# Patient Record
Sex: FEMALE | Race: WHITE | NOT HISPANIC OR LATINO | URBAN - METROPOLITAN AREA
[De-identification: names, ages, dates, MRNs, and addresses within clinical notes are randomized per-mention and may not be internally consistent; named-entity substitution may affect disease eponyms.]

---

## 2023-05-15 ENCOUNTER — NURSE TRIAGE (OUTPATIENT)
Dept: ADMINISTRATIVE | Facility: CLINIC | Age: 23
End: 2023-05-15
Payer: COMMERCIAL

## 2023-05-15 NOTE — TELEPHONE ENCOUNTER
Patient requesting pain medication. States that she has chronic abdominal pain and she is currently having a flare up. Currently reports constant pain 8/10 x 8 days. Advised per protocol to go to the nearest ED now for further evaluation. New medication prescription policy was discussed with the patient. Patient VU. Advised the patient to call back with any further questions or if symptoms worsen.        Reason for Disposition   [1] SEVERE pain (e.g., excruciating) AND [2] present > 1 hour    Additional Information   Negative: Shock suspected (e.g., cold/pale/clammy skin, too weak to stand, low BP, rapid pulse)   Negative: Difficult to awaken or acting confused (e.g., disoriented, slurred speech)   Negative: Passed out (i.e., lost consciousness, collapsed and was not responding)   Negative: Sounds like a life-threatening emergency to the triager    Protocols used: Abdominal Pain - Female-A-

## 2023-10-23 ENCOUNTER — TELEPHONE (OUTPATIENT)
Dept: GASTROENTEROLOGY | Facility: CLINIC | Age: 23
End: 2023-10-23
Payer: MEDICAID

## 2023-10-23 ENCOUNTER — OFFICE VISIT (OUTPATIENT)
Dept: GASTROENTEROLOGY | Facility: CLINIC | Age: 23
End: 2023-10-23
Payer: MEDICAID

## 2023-10-23 VITALS
WEIGHT: 260.13 LBS | HEART RATE: 85 BPM | HEIGHT: 61 IN | DIASTOLIC BLOOD PRESSURE: 78 MMHG | BODY MASS INDEX: 49.11 KG/M2 | SYSTOLIC BLOOD PRESSURE: 115 MMHG

## 2023-10-23 DIAGNOSIS — R10.9 ABDOMINAL PAIN, UNSPECIFIED ABDOMINAL LOCATION: Primary | ICD-10-CM

## 2023-10-23 PROCEDURE — 99999 PR PBB SHADOW E&M-EST. PATIENT-LVL III: CPT | Mod: PBBFAC,,, | Performed by: STUDENT IN AN ORGANIZED HEALTH CARE EDUCATION/TRAINING PROGRAM

## 2023-10-23 PROCEDURE — 99213 OFFICE O/P EST LOW 20 MIN: CPT | Mod: PBBFAC | Performed by: STUDENT IN AN ORGANIZED HEALTH CARE EDUCATION/TRAINING PROGRAM

## 2023-10-23 PROCEDURE — 99205 OFFICE O/P NEW HI 60 MIN: CPT | Mod: S$PBB,,, | Performed by: STUDENT IN AN ORGANIZED HEALTH CARE EDUCATION/TRAINING PROGRAM

## 2023-10-23 PROCEDURE — 99999 PR PBB SHADOW E&M-EST. PATIENT-LVL III: ICD-10-PCS | Mod: PBBFAC,,, | Performed by: STUDENT IN AN ORGANIZED HEALTH CARE EDUCATION/TRAINING PROGRAM

## 2023-10-23 PROCEDURE — 99205 PR OFFICE/OUTPT VISIT, NEW, LEVL V, 60-74 MIN: ICD-10-PCS | Mod: S$PBB,,, | Performed by: STUDENT IN AN ORGANIZED HEALTH CARE EDUCATION/TRAINING PROGRAM

## 2023-10-23 RX ORDER — DICLOFENAC SODIUM 30 MG/G
1 GEL TOPICAL 2 TIMES DAILY
COMMUNITY

## 2023-10-23 RX ORDER — IBUPROFEN 600 MG/1
1 TABLET ORAL
COMMUNITY
Start: 2023-09-02 | End: 2024-02-15

## 2023-10-23 RX ORDER — HYDROXYZINE PAMOATE 25 MG/1
25 CAPSULE ORAL 3 TIMES DAILY
COMMUNITY

## 2023-10-23 RX ORDER — DICYCLOMINE HYDROCHLORIDE 10 MG/1
10 CAPSULE ORAL
Qty: 120 CAPSULE | Refills: 0 | Status: CANCELLED | OUTPATIENT
Start: 2023-10-23 | End: 2023-11-22

## 2023-10-23 RX ORDER — DICYCLOMINE HYDROCHLORIDE 20 MG/1
1 TABLET ORAL DAILY
COMMUNITY
Start: 2023-09-02

## 2023-10-23 RX ORDER — ESCITALOPRAM OXALATE 10 MG/1
1 TABLET ORAL DAILY
COMMUNITY
Start: 2023-10-22

## 2023-10-23 NOTE — PROGRESS NOTES
"    [facility] Consultation Note    Reason for Consult:  "mass in stomach"     PCP:   No, Primary Doctor       Referring MD:  No referring provider defined for this encounter.    HPI:  This is a 23 y.o. female here for evaluation of abdominal pain. She has had a long history of abdominal pain issues but they have worsened in the past month. She has had 3 ED visits for this pain. She tells me she was told she had a mass in her stomach on US but this turns out to be a uterine fibroid from the results in care everywhere. Pain is in the central abdomen and is cramping in nature. Sometimes stabbing or squeezing pain. Burning character at times. She has mild reflux symptoms which do not feel related to abdominal pain and pepcid completely relieves. She has tried bentyl for the pain which has not helped. Her BM are mostly loose in consistency and occur 1-2 times daily. She was seeing blood in stool in 9/2023 and has cscope which was normal except for hemorrhoids. I cannot see that report. No other endoscopy. No abdominal imaging.     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 Thinks this was in her stomach. Has had abdominal pain episodes for years but they were mild. Around 13-14 years ago she started to have more abdominal cramping and bloating episodes. Also was diagnosed with PCOS at age 16.     Mother had cancer which she is not sure if it was stomach and colon. No dysphagia, hematemesis, melena, weight loss, N/V.        Objective Findings:    Vital Signs:  /78 (BP Location: Left arm, Patient Position: Sitting, BP Method: Large (Automatic))   Pulse 85   Ht 5' 1" (1.549 m)   Wt 118 kg (260 lb 2.3 oz)   BMI 49.15 kg/m²   Body mass index is 49.15 kg/m².    Physical Exam:  General Appearance: Well appearing in no acute distress  Head:   Normocephalic, without obvious abnormality  Eyes:    No scleral icterus    Lungs: CTA bilaterally in anterior and posterior " fields   Heart:  Regular rate and rhythm, no murmurs heard  Abdomen: Soft, non tender, non distended with positive bowel sounds in all four quadrants.      Imaging:  None     Endoscopy:    None     Assessment:    Abdominal cramping   Reflux   Fibroids   Hemorrhoids     Patient with 1 month of worsening episodes of abdominal pain. Pain is central abdomen and cramping at times and also squeezing/stabbing. Check blood work and celiac panel. US ab. Had recent cscope which showed hemorrhoids. Not currently with blood in stool. Bentyl does not help pain. Also recommended she see GYN about fibroids. No NSAID use . Negative HP testing also at OSH.     Recommendations:    - CBC, CMP, celiac    - US Ab   - Bentyl PRN   - Pepcid PRN   - Further plan base on results   - No NSAIDs     RTC 3 months       Order summary:  Orders Placed This Encounter    US Abdomen Complete    CBC Auto Differential    COMPREHENSIVE METABOLIC PANEL    Celiac Disease Panel         Thank you so much for allowing me to participate in the care of Anne Hung MD  Gastroenterology   [facility]

## 2023-10-23 NOTE — TELEPHONE ENCOUNTER
Called Patient regarding 8:30AM appointment with . Informed Pt we received message she will be running late due to the Fog and that  will still see her. Pt confirmed and verbalized understanding.

## 2024-08-30 ENCOUNTER — OFFICE VISIT (OUTPATIENT)
Dept: PRIMARY CARE CLINIC | Facility: CLINIC | Age: 24
End: 2024-08-30
Payer: MEDICAID

## 2024-08-30 VITALS
TEMPERATURE: 99 F | DIASTOLIC BLOOD PRESSURE: 80 MMHG | SYSTOLIC BLOOD PRESSURE: 120 MMHG | HEIGHT: 61 IN | RESPIRATION RATE: 18 BRPM | BODY MASS INDEX: 52.82 KG/M2 | WEIGHT: 279.75 LBS | HEART RATE: 90 BPM | OXYGEN SATURATION: 96 %

## 2024-08-30 DIAGNOSIS — L73.2 AXILLARY HIDRADENITIS SUPPURATIVA: Primary | ICD-10-CM

## 2024-08-30 DIAGNOSIS — K58.2 IRRITABLE BOWEL SYNDROME WITH BOTH CONSTIPATION AND DIARRHEA: ICD-10-CM

## 2024-08-30 DIAGNOSIS — R10.9 CHRONIC ABDOMINAL PAIN: ICD-10-CM

## 2024-08-30 DIAGNOSIS — K80.20 CALCULUS OF GALLBLADDER WITHOUT CHOLECYSTITIS WITHOUT OBSTRUCTION: ICD-10-CM

## 2024-08-30 DIAGNOSIS — R93.5 ABNORMAL ULTRASOUND OF OVARY: ICD-10-CM

## 2024-08-30 DIAGNOSIS — G89.29 CHRONIC ABDOMINAL PAIN: ICD-10-CM

## 2024-08-30 PROCEDURE — 99215 OFFICE O/P EST HI 40 MIN: CPT | Mod: PBBFAC,PN | Performed by: FAMILY MEDICINE

## 2024-08-30 PROCEDURE — 99999 PR PBB SHADOW E&M-EST. PATIENT-LVL V: CPT | Mod: PBBFAC,,, | Performed by: FAMILY MEDICINE

## 2024-08-30 RX ORDER — CITALOPRAM 20 MG/1
20 TABLET, FILM COATED ORAL
COMMUNITY

## 2024-08-30 RX ORDER — DOXYCYCLINE 100 MG/1
100 CAPSULE ORAL 2 TIMES DAILY
Qty: 14 CAPSULE | Refills: 0 | Status: SHIPPED | OUTPATIENT
Start: 2024-08-30 | End: 2024-09-06

## 2024-08-30 RX ORDER — DICYCLOMINE HYDROCHLORIDE 20 MG/1
20 TABLET ORAL 3 TIMES DAILY
Qty: 90 TABLET | Refills: 2 | Status: SHIPPED | OUTPATIENT
Start: 2024-08-30

## 2024-08-30 RX ORDER — POLYETHYLENE GLYCOL 3350 17 G/17G
17 POWDER, FOR SOLUTION ORAL DAILY
Qty: 90 EACH | Refills: 3 | Status: SHIPPED | OUTPATIENT
Start: 2024-08-30

## 2024-08-30 NOTE — PROGRESS NOTES
Even In her Subjective     Patient ID: Anne Peterson is a 24 y.o. female.    Chief Complaint: No chief complaint on file.    HPI Patient is a 21-year-old female who presents with multiple complaints today.  Patient reports flare-up of hydro adenitis superior time.  She has chronic scarring in pus drainage at present.  She also reports chronic abdominal pain since age of 8.  She states her abdomen swells from time to time.  She reports constipation alternating with diarrhea.  She was prescribed Bentyl in the past but does not recall taking the medication.  She does have a present gallstone.  Patient is convinced she has possible endometriosis because it runs in her family.  She did have a recent ultrasound which showed an abnormal avascular area in the right ovary.  Patient has had evaluations by both Gastroenterology and gyn.  She does report dysmenorrhea and dyspareunia.n his:    Review of Systems   Gastrointestinal:  Positive for abdominal distention, constipation, diarrhea and vomiting. Negative for nausea and reflux.   Genitourinary:  Positive for dyspareunia and menstrual problem (dysmenorrhea).          Objective     Physical Exam  Constitutional:       Appearance: She is well-developed. She is obese.   HENT:      Head: Normocephalic and atraumatic.   Neck:      Thyroid: No thyromegaly.   Cardiovascular:      Rate and Rhythm: Normal rate and regular rhythm.      Heart sounds: Normal heart sounds. No murmur heard.  Pulmonary:      Effort: Pulmonary effort is normal. No respiratory distress.      Breath sounds: Normal breath sounds. No wheezing or rales.   Abdominal:      General: Bowel sounds are normal. There is no distension.      Palpations: Abdomen is soft. There is no mass.      Tenderness: There is no abdominal tenderness.   Musculoskeletal:      Cervical back: Normal range of motion and neck supple.      Right lower leg: No edema.      Left lower leg: No edema.   Lymphadenopathy:      Cervical: No  cervical adenopathy.   Skin:     General: Skin is warm and dry.      Findings: Lesion (Multiple areas of open wounds with pus drainage and erythema and fluctuance noted.  Scarring also noted.) present. No rash.   Neurological:      General: No focal deficit present.      Mental Status: She is alert and oriented to person, place, and time.   Psychiatric:         Mood and Affect: Mood normal.            Assessment and Plan     1. Axillary hidradenitis suppurativa  Patient with a flare-up of hidradenitis.  Will prescribe doxycycline.  Patient advised that she see use protection while taking this medication.  Will refer to Dermatology for other treatment options.  -     doxycycline (VIBRAMYCIN) 100 MG Cap; Take 1 capsule (100 mg total) by mouth 2 (two) times daily. for 7 days  Dispense: 14 capsule; Refill: 0  -     Ambulatory referral/consult to Dermatology; Future; Expected date: 09/06/2024    2. Calculus of gallbladder without cholecystitis without obstruction  Patient without symptoms of acute cholelithiasis at this time.    3. Abnormal ultrasound of ovary  Will refer to gyn for 2nd opinion  -     Ambulatory referral/consult to Gynecology; Future; Expected date: 09/06/2024    4. Chronic abdominal pain  Suspect her abdominal pain may be related to IBS.    5. Irritable bowel syndrome with both constipation and diarrhea  -     dicyclomine (BENTYL) 20 mg tablet; Take 1 tablet (20 mg total) by mouth 3 (three) times daily.  Dispense: 90 tablet; Refill: 2  -     polyethylene glycol (GLYCOLAX) 17 gram PwPk; Take 17 g by mouth once daily.  Dispense: 90 each; Refill: 3                 No follow-ups on file.

## 2024-09-20 ENCOUNTER — OFFICE VISIT (OUTPATIENT)
Dept: SURGERY | Facility: CLINIC | Age: 24
End: 2024-09-20
Payer: MEDICAID

## 2024-09-20 VITALS
HEART RATE: 99 BPM | DIASTOLIC BLOOD PRESSURE: 81 MMHG | SYSTOLIC BLOOD PRESSURE: 145 MMHG | BODY MASS INDEX: 53.35 KG/M2 | WEIGHT: 282.19 LBS

## 2024-09-20 DIAGNOSIS — L73.2 AXILLARY HIDRADENITIS SUPPURATIVA: Primary | ICD-10-CM

## 2024-09-20 PROCEDURE — 3079F DIAST BP 80-89 MM HG: CPT | Mod: CPTII,,, | Performed by: SURGERY

## 2024-09-20 PROCEDURE — 99213 OFFICE O/P EST LOW 20 MIN: CPT | Mod: PBBFAC,PN | Performed by: SURGERY

## 2024-09-20 PROCEDURE — 99999 PR PBB SHADOW E&M-EST. PATIENT-LVL III: CPT | Mod: PBBFAC,,, | Performed by: SURGERY

## 2024-09-20 PROCEDURE — 1159F MED LIST DOCD IN RCRD: CPT | Mod: CPTII,,, | Performed by: SURGERY

## 2024-09-20 PROCEDURE — 3077F SYST BP >= 140 MM HG: CPT | Mod: CPTII,,, | Performed by: SURGERY

## 2024-09-20 PROCEDURE — 1160F RVW MEDS BY RX/DR IN RCRD: CPT | Mod: CPTII,,, | Performed by: SURGERY

## 2024-09-20 PROCEDURE — 99204 OFFICE O/P NEW MOD 45 MIN: CPT | Mod: S$PBB,,, | Performed by: SURGERY

## 2024-09-20 PROCEDURE — 3008F BODY MASS INDEX DOCD: CPT | Mod: CPTII,,, | Performed by: SURGERY

## 2024-10-03 ENCOUNTER — TELEPHONE (OUTPATIENT)
Dept: SURGERY | Facility: CLINIC | Age: 24
End: 2024-10-03
Payer: MEDICAID

## 2024-10-03 RX ORDER — SODIUM CHLORIDE 9 MG/ML
INJECTION, SOLUTION INTRAVENOUS CONTINUOUS
OUTPATIENT
Start: 2024-10-03

## 2024-10-03 RX ORDER — ENOXAPARIN SODIUM 100 MG/ML
40 INJECTION SUBCUTANEOUS
OUTPATIENT
Start: 2024-10-03

## 2024-10-03 RX ORDER — CLINDAMYCIN PHOSPHATE 900 MG/50ML
900 INJECTION, SOLUTION INTRAVENOUS
OUTPATIENT
Start: 2024-10-03

## 2024-10-03 NOTE — PROGRESS NOTES
History & Physical    Subjective     History of Present Illness:  Patient is a 24 y.o. female presents with complaints of a pilonidal cyst which continues to cause trouble.    Several episodes of inflammation and tenderness in the upper sacral area.    Patient also has substantial hidradenitis in both axillas.    I discussed with her surgical intervention versus following up with a dermatologist, however patient states she was tried different things and wants surgical intervention to remove.    Discussed with her that the surgery has a difficult healing process and has substantial wound infection risk.    Chief Complaint   Patient presents with    Cyst       Review of patient's allergies indicates:   Allergen Reactions    Cephalosporins Anaphylaxis, Hives, Itching, Shortness Of Breath, Rash and Swelling    Sulfa (sulfonamide antibiotics) Anaphylaxis, Hives, Itching, Rash and Swelling       Current Outpatient Medications   Medication Sig Dispense Refill    citalopram (CELEXA) 20 MG tablet Take 20 mg by mouth.      ciprofloxacin HCl (CIPRO) 500 MG tablet Take 1 tablet (500 mg total) by mouth 2 (two) times daily. (Patient not taking: Reported on 8/30/2024) 14 tablet 0    diclofenac sodium (SOLARAZE) 3 % gel Apply 1 Application topically 2 (two) times daily. (Patient not taking: Reported on 8/30/2024)      dicyclomine (BENTYL) 20 mg tablet Take 1 tablet (20 mg total) by mouth 3 (three) times daily. (Patient not taking: Reported on 9/20/2024) 90 tablet 2    EScitalopram oxalate (LEXAPRO) 10 MG tablet Take 1 tablet by mouth once daily. (Patient not taking: Reported on 8/30/2024)      HYDROcodone-acetaminophen (NORCO) 5-325 mg per tablet Take 1 tablet by mouth every 6 (six) hours as needed for Pain. (Patient not taking: Reported on 8/30/2024) 12 tablet 0    hydrOXYzine pamoate (VISTARIL) 25 MG Cap Take 25 mg by mouth 3 (three) times daily. (Patient not taking: Reported on 8/30/2024)      ibuprofen (ADVIL,MOTRIN) 600 MG  tablet Take 1 tablet (600 mg total) by mouth as needed for Pain. (Patient not taking: Reported on 8/30/2024) 20 tablet 0    polyethylene glycol (GLYCOLAX) 17 gram PwPk Take 17 g by mouth once daily. (Patient not taking: Reported on 9/20/2024) 90 each 3     No current facility-administered medications for this visit.       History reviewed. No pertinent past medical history.  History reviewed. No pertinent surgical history.  No family history on file.  Social History     Tobacco Use    Smoking status: Every Day     Current packs/day: 0.50     Types: Cigarettes    Smokeless tobacco: Current   Substance Use Topics    Alcohol use: Never    Drug use: Never        Review of Systems:  Review of Systems   Constitutional:  Negative for appetite change, fatigue, fever and unexpected weight change.   HENT:  Negative for sore throat and trouble swallowing.    Eyes: Negative.    Respiratory:  Negative for cough, shortness of breath and wheezing.    Cardiovascular:  Negative for chest pain and leg swelling.   Gastrointestinal:  Negative for abdominal distention, abdominal pain, blood in stool, constipation, diarrhea, nausea and vomiting.   Endocrine: Negative.    Genitourinary: Negative.    Musculoskeletal:  Negative for back pain.   Skin: Negative.  Negative for rash.   Allergic/Immunologic: Negative.    Neurological: Negative.    Hematological: Negative.    Psychiatric/Behavioral:  Negative for confusion.         Objective     Vital Signs (Most Recent)  Pulse: 99 (09/20/24 1120)  BP: (!) 145/81 (09/20/24 1120)     128 kg (282 lb 3 oz)     Physical Exam:  Physical Exam  Vitals and nursing note reviewed.   Constitutional:       Appearance: She is well-developed.   HENT:      Head: Normocephalic and atraumatic.   Cardiovascular:      Rate and Rhythm: Normal rate.      Heart sounds: Normal heart sounds.   Pulmonary:      Effort: Pulmonary effort is normal.   Abdominal:      General: Bowel sounds are normal. There is no distension.       Palpations: Abdomen is soft.      Tenderness: There is no abdominal tenderness.   Musculoskeletal:         General: Normal range of motion.      Cervical back: Normal range of motion.   Skin:     General: Skin is warm and dry.      Capillary Refill: Capillary refill takes less than 2 seconds.             Comments: Bilateral axillary hidradenitis   Chronically infected pilonidal cyst   Neurological:      Mental Status: She is alert and oriented to person, place, and time.   Psychiatric:         Behavior: Behavior normal.     Laboratory  CBC: Reviewed  CMP: Reviewed    Diagnostic Results:  Labs: Reviewed       Assessment and Plan   24yF with bilateral axillary hidradenitis, pilonidal cyst    PLAN:    Removal in OR

## 2024-10-03 NOTE — TELEPHONE ENCOUNTER
Pt schedule for surgery 11/13 as per her requested. Pt advise that pre op will call a week before for pre op instructions.

## 2024-10-03 NOTE — TELEPHONE ENCOUNTER
"----- Message from Valentino sent at 10/3/2024  1:36 PM CDT -----  Regarding: MISS CALL  Consult/Advisory:        Name Of Caller: Self        Contact Preference?:[phone]        What is the nature of the call?: Returning MISS call         Additional Notes:  "Thank you for all that you do for our patients"  "

## 2024-10-10 ENCOUNTER — TELEPHONE (OUTPATIENT)
Dept: INTERNAL MEDICINE | Facility: CLINIC | Age: 24
End: 2024-10-10
Payer: MEDICAID

## 2024-10-10 ENCOUNTER — OFFICE VISIT (OUTPATIENT)
Dept: INTERNAL MEDICINE | Facility: CLINIC | Age: 24
End: 2024-10-10
Payer: MEDICAID

## 2024-10-10 ENCOUNTER — LAB VISIT (OUTPATIENT)
Dept: LAB | Facility: HOSPITAL | Age: 24
End: 2024-10-10
Payer: MEDICAID

## 2024-10-10 VITALS
SYSTOLIC BLOOD PRESSURE: 126 MMHG | HEART RATE: 101 BPM | WEIGHT: 285.06 LBS | HEIGHT: 61 IN | OXYGEN SATURATION: 97 % | BODY MASS INDEX: 53.82 KG/M2 | DIASTOLIC BLOOD PRESSURE: 76 MMHG

## 2024-10-10 DIAGNOSIS — Z11.4 SCREENING FOR HIV (HUMAN IMMUNODEFICIENCY VIRUS): ICD-10-CM

## 2024-10-10 DIAGNOSIS — R10.9 ABDOMINAL PAIN, UNSPECIFIED ABDOMINAL LOCATION: ICD-10-CM

## 2024-10-10 DIAGNOSIS — Z13.220 SCREENING FOR LIPID DISORDERS: ICD-10-CM

## 2024-10-10 DIAGNOSIS — K58.2 IRRITABLE BOWEL SYNDROME WITH BOTH CONSTIPATION AND DIARRHEA: ICD-10-CM

## 2024-10-10 DIAGNOSIS — F41.9 ANXIETY: ICD-10-CM

## 2024-10-10 DIAGNOSIS — F30.9 MANIA: ICD-10-CM

## 2024-10-10 DIAGNOSIS — Z13.1 SCREENING FOR DIABETES MELLITUS: ICD-10-CM

## 2024-10-10 DIAGNOSIS — Z11.59 ENCOUNTER FOR HEPATITIS C SCREENING TEST FOR LOW RISK PATIENT: ICD-10-CM

## 2024-10-10 DIAGNOSIS — F17.200 SMOKER: ICD-10-CM

## 2024-10-10 DIAGNOSIS — Z76.89 ENCOUNTER TO ESTABLISH CARE: Primary | ICD-10-CM

## 2024-10-10 LAB
ALBUMIN SERPL BCP-MCNC: 3.6 G/DL (ref 3.5–5.2)
ALP SERPL-CCNC: 118 U/L (ref 55–135)
ALT SERPL W/O P-5'-P-CCNC: 31 U/L (ref 10–44)
ANION GAP SERPL CALC-SCNC: 9 MMOL/L (ref 8–16)
AST SERPL-CCNC: 21 U/L (ref 10–40)
BASOPHILS # BLD AUTO: 0.09 K/UL (ref 0–0.2)
BASOPHILS NFR BLD: 0.7 % (ref 0–1.9)
BILIRUB SERPL-MCNC: 0.3 MG/DL (ref 0.1–1)
BUN SERPL-MCNC: 11 MG/DL (ref 6–20)
CALCIUM SERPL-MCNC: 9.3 MG/DL (ref 8.7–10.5)
CHLORIDE SERPL-SCNC: 106 MMOL/L (ref 95–110)
CHOLEST SERPL-MCNC: 147 MG/DL (ref 120–199)
CHOLEST/HDLC SERPL: 4.2 {RATIO} (ref 2–5)
CO2 SERPL-SCNC: 22 MMOL/L (ref 23–29)
CREAT SERPL-MCNC: 1.1 MG/DL (ref 0.5–1.4)
DIFFERENTIAL METHOD BLD: ABNORMAL
EOSINOPHIL # BLD AUTO: 0.5 K/UL (ref 0–0.5)
EOSINOPHIL NFR BLD: 3.8 % (ref 0–8)
ERYTHROCYTE [DISTWIDTH] IN BLOOD BY AUTOMATED COUNT: 13.2 % (ref 11.5–14.5)
EST. GFR  (NO RACE VARIABLE): >60 ML/MIN/1.73 M^2
ESTIMATED AVG GLUCOSE: 163 MG/DL (ref 68–131)
GLUCOSE SERPL-MCNC: 184 MG/DL (ref 70–110)
HBA1C MFR BLD: 7.3 % (ref 4–5.6)
HCT VFR BLD AUTO: 41.4 % (ref 37–48.5)
HDLC SERPL-MCNC: 35 MG/DL (ref 40–75)
HDLC SERPL: 23.8 % (ref 20–50)
HGB BLD-MCNC: 13.3 G/DL (ref 12–16)
IMM GRANULOCYTES # BLD AUTO: 0.08 K/UL (ref 0–0.04)
IMM GRANULOCYTES NFR BLD AUTO: 0.6 % (ref 0–0.5)
LDLC SERPL CALC-MCNC: 58.8 MG/DL (ref 63–159)
LYMPHOCYTES # BLD AUTO: 3.2 K/UL (ref 1–4.8)
LYMPHOCYTES NFR BLD: 24.8 % (ref 18–48)
MCH RBC QN AUTO: 28.5 PG (ref 27–31)
MCHC RBC AUTO-ENTMCNC: 32.1 G/DL (ref 32–36)
MCV RBC AUTO: 89 FL (ref 82–98)
MONOCYTES # BLD AUTO: 0.9 K/UL (ref 0.3–1)
MONOCYTES NFR BLD: 7.3 % (ref 4–15)
NEUTROPHILS # BLD AUTO: 8 K/UL (ref 1.8–7.7)
NEUTROPHILS NFR BLD: 62.8 % (ref 38–73)
NONHDLC SERPL-MCNC: 112 MG/DL
NRBC BLD-RTO: 0 /100 WBC
PLATELET # BLD AUTO: 360 K/UL (ref 150–450)
PMV BLD AUTO: 11 FL (ref 9.2–12.9)
POTASSIUM SERPL-SCNC: 3.9 MMOL/L (ref 3.5–5.1)
PROT SERPL-MCNC: 7.3 G/DL (ref 6–8.4)
RBC # BLD AUTO: 4.66 M/UL (ref 4–5.4)
SODIUM SERPL-SCNC: 137 MMOL/L (ref 136–145)
TRIGL SERPL-MCNC: 266 MG/DL (ref 30–150)
WBC # BLD AUTO: 12.82 K/UL (ref 3.9–12.7)

## 2024-10-10 PROCEDURE — 1160F RVW MEDS BY RX/DR IN RCRD: CPT | Mod: CPTII,,,

## 2024-10-10 PROCEDURE — 87389 HIV-1 AG W/HIV-1&-2 AB AG IA: CPT

## 2024-10-10 PROCEDURE — 83036 HEMOGLOBIN GLYCOSYLATED A1C: CPT

## 2024-10-10 PROCEDURE — 80053 COMPREHEN METABOLIC PANEL: CPT

## 2024-10-10 PROCEDURE — 99215 OFFICE O/P EST HI 40 MIN: CPT | Mod: PBBFAC

## 2024-10-10 PROCEDURE — 1159F MED LIST DOCD IN RCRD: CPT | Mod: CPTII,,,

## 2024-10-10 PROCEDURE — 86803 HEPATITIS C AB TEST: CPT

## 2024-10-10 PROCEDURE — 3051F HG A1C>EQUAL 7.0%<8.0%: CPT | Mod: CPTII,,,

## 2024-10-10 PROCEDURE — 80061 LIPID PANEL: CPT

## 2024-10-10 PROCEDURE — 84443 ASSAY THYROID STIM HORMONE: CPT

## 2024-10-10 PROCEDURE — 3074F SYST BP LT 130 MM HG: CPT | Mod: CPTII,,,

## 2024-10-10 PROCEDURE — 3078F DIAST BP <80 MM HG: CPT | Mod: CPTII,,,

## 2024-10-10 PROCEDURE — 36415 COLL VENOUS BLD VENIPUNCTURE: CPT

## 2024-10-10 PROCEDURE — 85025 COMPLETE CBC W/AUTO DIFF WBC: CPT

## 2024-10-10 PROCEDURE — 3008F BODY MASS INDEX DOCD: CPT | Mod: CPTII,,,

## 2024-10-10 PROCEDURE — 99203 OFFICE O/P NEW LOW 30 MIN: CPT | Mod: S$PBB,,,

## 2024-10-10 PROCEDURE — 99999 PR PBB SHADOW E&M-EST. PATIENT-LVL V: CPT | Mod: PBBFAC,,,

## 2024-10-10 RX ORDER — DICYCLOMINE HYDROCHLORIDE 20 MG/1
20 TABLET ORAL 3 TIMES DAILY
Qty: 90 TABLET | Refills: 2 | Status: SHIPPED | OUTPATIENT
Start: 2024-10-10

## 2024-10-10 RX ORDER — SENNOSIDES 8.6 MG/1
1 TABLET ORAL DAILY
Qty: 30 TABLET | Refills: 11 | Status: SHIPPED | OUTPATIENT
Start: 2024-10-10 | End: 2025-10-10

## 2024-10-10 RX ORDER — CELECOXIB 200 MG/1
200 CAPSULE ORAL 2 TIMES DAILY
Qty: 60 CAPSULE | Refills: 0 | Status: SHIPPED | OUTPATIENT
Start: 2024-10-10 | End: 2024-11-09

## 2024-10-10 RX ORDER — CELECOXIB 200 MG/1
200 CAPSULE ORAL 2 TIMES DAILY
Qty: 60 CAPSULE | Refills: 0 | Status: SHIPPED | OUTPATIENT
Start: 2024-10-10 | End: 2024-10-10

## 2024-10-10 NOTE — TELEPHONE ENCOUNTER
----- Message from Ekaterina sent at 10/10/2024  3:45 PM CDT -----  Contact: [phone]  [facility] is calling in regards to the prescription for Celebrex she states they are needing better directions please advise

## 2024-10-10 NOTE — PROGRESS NOTES
"Subjective     Chief Complaint: Hospital Follow up    History of Present Illness:  Ms. Anne Peterson is a 24 y.o. female w/ BMI 53 who was recently seen in the ED for vague nonspecific complaints. Pasted below :    "complaints of intermittent abdominal cramping that is worse during her cycle and 4x episodes of vomiting this morning. The patient states there was no blood in her vomit.The patient states the pain got to a 10 last night and she was doubled over in pain and unable to ambulate; now endorses the pain is an 8. The patient states the pain started when she was 8 years old and has progressively worsened since having her son 3 years ago. The patient states she has oligomenorrhea and has always had inconsistent cycles stating her period was supposed to start October 5th. The patient denies heavy periods.The patient states laying on her stomach and drinking water makes the pain worse. She states nothing makes the pain better and has tried tylenol and ibuprofen. The patient is not on OCP. The patient states she has inconsistent bowel movements cycling between soft stools and pellets and endorses blood in stool.The patient denies fever, chills, SOB, CP, difficulty urinating, burning when urinating, painful sex. "    Pain yesterday night to yesterday am, went to hospital. Got Toradol. Matt helped. Xanaflex also matt helped. Dystopic Kaylee Gehrig    Still in pain    7/8, 9/10.     Bentyl did not help. Advil did not help. T# w/ codeine helps. Baths, tylenol, cold, heating pads, jet bathtub. Nothing helps.     Significant Med HX  Anxiety  PCOS  PID  "Intestional inflammation"  Fibroid    Current Meds  Escitalopram, otc ibupofen tylenol once or twice a week. Takes for headache.       Allergies  Sulfa and Cephlosporins  Gabapentin  Tylenol PM    Surgical Hx  Colonscopy 10/23    Family Hx  UC/Chrons  Stomach Cancer  Celiacs  Eso Cancer  Rectal Cancer  Mom w/ 22 different abdominal surgeries.     Tobacco  Smoke 1/2 " PPD  Trying to quit    Alcohol  Denies    Drugs  No    Social  Engaged  Just 1, at least.     Review of Systems   Constitutional:  Negative for chills and fever.   HENT:  Positive for hearing loss.    Respiratory:  Positive for cough and wheezing. Negative for shortness of breath.    Cardiovascular:  Positive for chest pain (w/ drinking). Negative for palpitations.   Gastrointestinal:  Positive for abdominal pain, blood in stool (dark red), constipation, diarrhea, heartburn, nausea and vomiting.   Genitourinary:  Negative for dysuria, frequency and urgency.   Skin: Negative.    Neurological:         Itching   Psychiatric/Behavioral:  Positive for depression. The patient is nervous/anxious.          PAST HISTORY:     History reviewed. No pertinent past medical history.    History reviewed. No pertinent surgical history.    No family history on file.    Social History     Socioeconomic History    Marital status: Significant Other   Tobacco Use    Smoking status: Every Day     Current packs/day: 0.50     Types: Cigarettes    Smokeless tobacco: Current   Substance and Sexual Activity    Alcohol use: Never    Drug use: Never    Sexual activity: Yes     Partners: Male     Social Drivers of Health     Financial Resource Strain: Low Risk  (8/24/2024)    Overall Financial Resource Strain (CARDIA)     Difficulty of Paying Living Expenses: Not hard at all   Food Insecurity: No Food Insecurity (8/24/2024)    Hunger Vital Sign     Worried About Running Out of Food in the Last Year: Never true     Ran Out of Food in the Last Year: Never true   Physical Activity: Inactive (8/24/2024)    Exercise Vital Sign     Days of Exercise per Week: 0 days     Minutes of Exercise per Session: 0 min   Stress: Stress Concern Present (8/24/2024)    [employer] - Occupational Stress Questionnaire     Feeling of Stress : Very much   Housing Stability: Unknown (8/24/2024)    Housing Stability Vital Sign     Unable to Pay  "for Housing in the Last Year: No       MEDICATIONS & ALLERGIES:     Current Outpatient Medications on File Prior to Visit   Medication Sig    ciprofloxacin HCl (CIPRO) 500 MG tablet Take 1 tablet (500 mg total) by mouth 2 (two) times daily. (Patient not taking: Reported on 8/30/2024)    citalopram (CELEXA) 20 MG tablet Take 20 mg by mouth.    diclofenac sodium (SOLARAZE) 3 % gel Apply 1 Application topically 2 (two) times daily. (Patient not taking: Reported on 8/30/2024)    EScitalopram oxalate (LEXAPRO) 10 MG tablet Take 1 tablet by mouth once daily. (Patient not taking: Reported on 8/30/2024)    hydrOXYzine pamoate (VISTARIL) 25 MG Cap Take 25 mg by mouth 3 (three) times daily. (Patient not taking: Reported on 8/30/2024)    ibuprofen (ADVIL,MOTRIN) 600 MG tablet Take 1 tablet (600 mg total) by mouth as needed for Pain. (Patient not taking: Reported on 8/30/2024)    polyethylene glycol (GLYCOLAX) 17 gram PwPk Take 17 g by mouth once daily. (Patient not taking: Reported on 9/20/2024)    [DISCONTINUED] dicyclomine (BENTYL) 20 mg tablet Take 1 tablet (20 mg total) by mouth 3 (three) times daily. (Patient not taking: Reported on 9/20/2024)    [DISCONTINUED] HYDROcodone-acetaminophen (NORCO) 5-325 mg per tablet Take 1 tablet by mouth every 6 (six) hours as needed for Pain. (Patient not taking: Reported on 8/30/2024)     No current facility-administered medications on file prior to visit.       Review of patient's allergies indicates:   Allergen Reactions    Cephalosporins Anaphylaxis, Hives, Itching, Shortness Of Breath, Rash and Swelling    Sulfa (sulfonamide antibiotics) Anaphylaxis, Hives, Itching, Rash and Swelling       OBJECTIVE:     Vital Signs:  Vitals:    10/10/24 1511   BP: 126/76   Pulse: 101   SpO2: 97%   Weight: 129.3 kg (285 lb 0.9 oz)   Height: 5' 0.98" (1.549 m)       Body mass index is 53.9 kg/m².     Physical Exam  Vitals and nursing note reviewed. Exam conducted with a chaperone present. "   Constitutional:       General: She is not in acute distress.     Appearance: She is obese. She is not ill-appearing, toxic-appearing or diaphoretic.      Comments: Smelled of smoke   HENT:      Head: Normocephalic and atraumatic.      Right Ear: External ear normal.      Left Ear: External ear normal.      Mouth/Throat:      Mouth: Mucous membranes are moist.      Pharynx: Oropharynx is clear. No oropharyngeal exudate.   Eyes:      General: No scleral icterus.     Extraocular Movements: Extraocular movements intact.      Conjunctiva/sclera: Conjunctivae normal.   Neck:      Vascular: No JVD.   Cardiovascular:      Rate and Rhythm: Normal rate and regular rhythm.      Pulses: Normal pulses.      Heart sounds: Normal heart sounds. No murmur heard.     No friction rub.   Pulmonary:      Effort: Pulmonary effort is normal. No respiratory distress.      Breath sounds: Wheezing present. No rales.   Abdominal:      General: Bowel sounds are normal. There is no distension.      Palpations: Abdomen is soft. There is no mass.      Tenderness: There is abdominal tenderness (Diffuse). There is no rebound.   Musculoskeletal:         General: No tenderness or signs of injury. Normal range of motion.      Cervical back: Normal range of motion and neck supple.      Right lower leg: No edema.      Left lower leg: No edema.   Skin:     General: Skin is warm and dry.      Coloration: Skin is not jaundiced or pale.      Findings: No erythema.   Neurological:      General: No focal deficit present.      Mental Status: She is alert and oriented to person, place, and time.   Psychiatric:         Mood and Affect: Mood and affect normal.         Behavior: Behavior normal.         Thought Content: Thought content normal.         Judgment: Judgment normal.         Laboratory  No results found for this or any previous visit (from the past 24 hours).    Diagnostic Results:      Health Maintenance Due   Topic Date Due    Hepatitis C Screening   Never done    Lipid Panel  Never done    Pneumococcal Vaccines (Age 0-64) (1 of 2 - PCV) Never done    HIV Screening  Never done    HPV Vaccines (1 - 3-dose series) Never done    TETANUS VACCINE  Never done    Pap Smear  Never done    Influenza Vaccine (1) Never done    COVID-19 Vaccine (1 - 2024-25 season) Never done         ASSESSMENT & PLAN:     1. Encounter to establish care    2. Irritable bowel syndrome with both constipation and diarrhea  -     dicyclomine (BENTYL) 20 mg tablet; Take 1 tablet (20 mg total) by mouth 3 (three) times daily.  Dispense: 90 tablet; Refill: 2    3. Screening for lipid disorders  -     Lipid panel; Future; Expected date: 10/10/2024    4. Screening for diabetes mellitus  -     HEMOGLOBIN A1C; Future; Expected date: 10/10/2024    5. Screening for HIV (human immunodeficiency virus)  -     HIV 1/2 Ag/Ab (4th Gen); Future; Expected date: 10/10/2024    6. Encounter for hepatitis C screening test for low risk patient  -     Hepatitis C antibody; Future; Expected date: 10/10/2024    7. Abdominal pain, unspecified abdominal location    8. BMI 50.0-59.9, adult  -     TSH; Future; Expected date: 10/10/2024  -     CBC W/ AUTO DIFFERENTIAL; Future; Expected date: 10/10/2024  -     Comprehensive Metabolic Panel; Future; Expected date: 10/10/2024  -     Ambulatory referral/consult to Nutrition Services; Future; Expected date: 10/17/2024    9. Smoker    Other orders  -     SENNA 8.6 mg tablet; Take 1 tablet by mouth once daily.  Dispense: 30 tablet; Refill: 11  -     celecoxib (CELEBREX) 200 MG capsule; Take 1 capsule (200 mg total) by mouth 2 (two) times daily. Initial: 400 mg, followed by an additional 200 mg approximately 12 hours later, if needed, on day 1; maintenance dose: 200 mg twice daily as needed; maximum daily maintenance dose: 400 mg/day. Begin at menses onset or 1 to 2 days prior to onset of menses for severe symptoms; usual duration: 1 to 5 days  Dispense: 60 capsule; Refill:  "0      Gastro appt 10/14  Ob appt 10/21  Sx removal of HS lesion 11/13  See above for further detail.    Patient has likely mutlifactorial causes of her non specific diffuse abdominal pain. Discussed that this PCP clinic does not prescribe narcotics. Discussed that she needs OB and GI follow up. Appointments already in place.     Ordered basic lab work up and prescribed multimodal pain control.     Discussed with patient and patients mother via phone and they are amenable to this plan. All questions asked were answered.       Amenable to smoking cessation, order placed.    Psych ref placed. Rec stopping SSRI given self reported "Umu"    RTC after visits to establish care. Will discuss labs over phone.    Discussed with Dr Lewis -- staff attestation to follow      Burke Forrest DO MPH  Internal Medicine PGY-2  [facility]    "

## 2024-10-11 LAB
HCV AB SERPL QL IA: NORMAL
HIV 1+2 AB+HIV1 P24 AG SERPL QL IA: NORMAL
TSH SERPL DL<=0.005 MIU/L-ACNC: 1.45 UIU/ML (ref 0.4–4)

## 2024-10-14 ENCOUNTER — PATIENT MESSAGE (OUTPATIENT)
Dept: BEHAVIORAL HEALTH | Facility: CLINIC | Age: 24
End: 2024-10-14
Payer: MEDICAID

## 2024-10-14 ENCOUNTER — TELEPHONE (OUTPATIENT)
Dept: SURGERY | Facility: CLINIC | Age: 24
End: 2024-10-14
Payer: MEDICAID

## 2024-10-14 NOTE — TELEPHONE ENCOUNTER
Spoke with patient. Stated that her axilla is draining some pus and is moderately inflamed. Indicated there is some pain/discomfort to site. Reminded her a surgical date has been scheduled. Informed provider will be forwarded this message. He may consider advancing the date of surgery or he may select to start her on antibiotics to decrease the inflammation and infection present. Verbalized understanding. No further issues discussed.

## 2024-10-14 NOTE — TELEPHONE ENCOUNTER
----- Message from Mala sent at 10/14/2024 11:27 AM CDT -----  Regarding: sooner procedure appt cyst closed and full of pus  Contact: Patient can be contacted @# [phone]  PT called req to speak to Nurse/staff regarding reschedule of procedure. PT states the cyst has closed up and now has a large pocket of puss. Please reach out to patient and advise at earliest opportunity    Patient can be contacted @# [phone]

## 2024-10-14 NOTE — PROGRESS NOTES
I have reviewed the notes, assessments, and/or procedures performed by Dr. Forrest, I concur with her/his documentation of Anne Peterson.  Date of Service: 10/10/2024

## 2024-10-17 ENCOUNTER — PATIENT MESSAGE (OUTPATIENT)
Dept: INTERNAL MEDICINE | Facility: CLINIC | Age: 24
End: 2024-10-17
Payer: MEDICAID

## 2024-10-18 ENCOUNTER — TELEPHONE (OUTPATIENT)
Dept: INTERNAL MEDICINE | Facility: CLINIC | Age: 24
End: 2024-10-18

## 2024-10-18 RX ORDER — METFORMIN HYDROCHLORIDE 500 MG/1
500 TABLET, EXTENDED RELEASE ORAL
Qty: 90 TABLET | Refills: 3 | Status: SHIPPED | OUTPATIENT
Start: 2024-10-18 | End: 2024-11-05

## 2024-10-18 RX ORDER — SEMAGLUTIDE 0.68 MG/ML
0.5 INJECTION, SOLUTION SUBCUTANEOUS
Qty: 3 ML | Refills: 11 | Status: SHIPPED | OUTPATIENT
Start: 2024-10-18 | End: 2025-10-18

## 2024-10-21 ENCOUNTER — TELEPHONE (OUTPATIENT)
Dept: BEHAVIORAL HEALTH | Facility: CLINIC | Age: 24
End: 2024-10-21
Payer: MEDICAID

## 2024-10-22 ENCOUNTER — HOSPITAL ENCOUNTER (EMERGENCY)
Facility: HOSPITAL | Age: 24
Discharge: HOME | End: 2024-10-22
Attending: EMERGENCY MEDICINE
Payer: MEDICAID

## 2024-10-22 VITALS
HEIGHT: 61 IN | RESPIRATION RATE: 16 BRPM | BODY MASS INDEX: 54.37 KG/M2 | SYSTOLIC BLOOD PRESSURE: 129 MMHG | OXYGEN SATURATION: 96 % | TEMPERATURE: 98 F | WEIGHT: 288 LBS | DIASTOLIC BLOOD PRESSURE: 72 MMHG | HEART RATE: 99 BPM

## 2024-10-22 DIAGNOSIS — R10.33 ACUTE PERIUMBILICAL PAIN: Primary | ICD-10-CM

## 2024-10-22 DIAGNOSIS — R19.7 NAUSEA, VOMITING, AND DIARRHEA: ICD-10-CM

## 2024-10-22 DIAGNOSIS — R11.2 NAUSEA, VOMITING, AND DIARRHEA: ICD-10-CM

## 2024-10-22 PROBLEM — K80.20 SYMPTOMATIC CHOLELITHIASIS: Status: ACTIVE | Noted: 2024-04-09

## 2024-10-22 LAB
ALBUMIN SERPL BCP-MCNC: 3.8 G/DL (ref 3.5–5.2)
ALP SERPL-CCNC: 109 U/L (ref 40–150)
ALT SERPL W/O P-5'-P-CCNC: 35 U/L (ref 10–44)
ANION GAP SERPL CALC-SCNC: 10 MMOL/L (ref 8–16)
AST SERPL-CCNC: 26 U/L (ref 10–40)
B-HCG UR QL: NEGATIVE
BASOPHILS # BLD AUTO: 0.1 K/UL (ref 0–0.2)
BASOPHILS NFR BLD: 0.8 % (ref 0–1.9)
BILIRUB SERPL-MCNC: 0.2 MG/DL (ref 0.1–1)
BILIRUB UR QL STRIP: NEGATIVE
BUN SERPL-MCNC: 15 MG/DL (ref 6–20)
CALCIUM SERPL-MCNC: 9.2 MG/DL (ref 8.7–10.5)
CHLORIDE SERPL-SCNC: 110 MMOL/L (ref 95–110)
CLARITY UR REFRACT.AUTO: CLEAR
CO2 SERPL-SCNC: 19 MMOL/L (ref 23–29)
COLOR UR AUTO: YELLOW
CREAT SERPL-MCNC: 0.7 MG/DL (ref 0.5–1.4)
CTP QC/QA: YES
DIFFERENTIAL METHOD BLD: ABNORMAL
EOSINOPHIL # BLD AUTO: 0.5 K/UL (ref 0–0.5)
EOSINOPHIL NFR BLD: 3.6 % (ref 0–8)
ERYTHROCYTE [DISTWIDTH] IN BLOOD BY AUTOMATED COUNT: 13.7 % (ref 11.5–14.5)
EST. GFR  (NO RACE VARIABLE): >60 ML/MIN/1.73 M^2
GLUCOSE SERPL-MCNC: 124 MG/DL (ref 70–110)
GLUCOSE UR QL STRIP: NEGATIVE
HCT VFR BLD AUTO: 41.3 % (ref 37–48.5)
HGB BLD-MCNC: 13.5 G/DL (ref 12–16)
HGB UR QL STRIP: ABNORMAL
IMM GRANULOCYTES # BLD AUTO: 0.12 K/UL (ref 0–0.04)
IMM GRANULOCYTES NFR BLD AUTO: 1 % (ref 0–0.5)
KETONES UR QL STRIP: NEGATIVE
LEUKOCYTE ESTERASE UR QL STRIP: NEGATIVE
LIPASE SERPL-CCNC: 29 U/L (ref 4–60)
LYMPHOCYTES # BLD AUTO: 3 K/UL (ref 1–4.8)
LYMPHOCYTES NFR BLD: 23.9 % (ref 18–48)
MCH RBC QN AUTO: 29.6 PG (ref 27–31)
MCHC RBC AUTO-ENTMCNC: 32.7 G/DL (ref 32–36)
MCV RBC AUTO: 91 FL (ref 82–98)
MONOCYTES # BLD AUTO: 0.9 K/UL (ref 0.3–1)
MONOCYTES NFR BLD: 6.9 % (ref 4–15)
NEUTROPHILS # BLD AUTO: 8.1 K/UL (ref 1.8–7.7)
NEUTROPHILS NFR BLD: 63.8 % (ref 38–73)
NITRITE UR QL STRIP: NEGATIVE
NRBC BLD-RTO: 0 /100 WBC
PH UR STRIP: 6 [PH] (ref 5–8)
PLATELET # BLD AUTO: 372 K/UL (ref 150–450)
PMV BLD AUTO: 10.5 FL (ref 9.2–12.9)
POTASSIUM SERPL-SCNC: 4.4 MMOL/L (ref 3.5–5.1)
PROT SERPL-MCNC: 7.8 G/DL (ref 6–8.4)
PROT UR QL STRIP: ABNORMAL
RBC # BLD AUTO: 4.56 M/UL (ref 4–5.4)
SODIUM SERPL-SCNC: 139 MMOL/L (ref 136–145)
SP GR UR STRIP: 1.03 (ref 1–1.03)
URN SPEC COLLECT METH UR: ABNORMAL
WBC # BLD AUTO: 12.62 K/UL (ref 3.9–12.7)

## 2024-10-22 PROCEDURE — 99284 EMERGENCY DEPT VISIT MOD MDM: CPT | Mod: 25

## 2024-10-22 PROCEDURE — 63600175 PHARM REV CODE 636 W HCPCS: Performed by: EMERGENCY MEDICINE

## 2024-10-22 PROCEDURE — 80053 COMPREHEN METABOLIC PANEL: CPT | Performed by: PHYSICIAN ASSISTANT

## 2024-10-22 PROCEDURE — 85025 COMPLETE CBC W/AUTO DIFF WBC: CPT | Performed by: PHYSICIAN ASSISTANT

## 2024-10-22 PROCEDURE — 81003 URINALYSIS AUTO W/O SCOPE: CPT | Performed by: PHYSICIAN ASSISTANT

## 2024-10-22 PROCEDURE — 83690 ASSAY OF LIPASE: CPT | Performed by: PHYSICIAN ASSISTANT

## 2024-10-22 PROCEDURE — 96374 THER/PROPH/DIAG INJ IV PUSH: CPT

## 2024-10-22 PROCEDURE — 81025 URINE PREGNANCY TEST: CPT | Performed by: PHYSICIAN ASSISTANT

## 2024-10-22 RX ORDER — DROPERIDOL 2.5 MG/ML
2.5 INJECTION, SOLUTION INTRAMUSCULAR; INTRAVENOUS
Status: COMPLETED | OUTPATIENT
Start: 2024-10-22 | End: 2024-10-22

## 2024-10-22 RX ADMIN — DROPERIDOL 2.5 MG: 2.5 INJECTION, SOLUTION INTRAMUSCULAR; INTRAVENOUS at 06:10

## 2024-10-22 NOTE — ED NOTES
Upon return from [facility], patient vomited on flloor, incont of diarrhea stool on floor in INTAKE room, offered wipes and gown, to go to ED BED per provider

## 2024-10-22 NOTE — FIRST PROVIDER EVALUATION
Emergency Department TeleTriage Encounter Note      CHIEF COMPLAINT    Chief Complaint   Patient presents with    Abdominal Pain     Periumbilical radiating to RLQ. Seen and evaluated by PCP and told to come to ED if worsened.        VITAL SIGNS   Initial Vitals [10/22/24 1532]   BP Pulse Resp Temp SpO2   (!) 143/85 84 19 98 °F (36.7 °C) 99 %      MAP       --            ALLERGIES    Review of patient's allergies indicates:   Allergen Reactions    Cephalosporins Anaphylaxis, Hives, Itching, Shortness Of Breath, Rash and Swelling    Sulfa (sulfonamide antibiotics) Anaphylaxis, Hives, Itching, Rash and Swelling       PROVIDER TRIAGE NOTE  Patient presents with epigastric abdominal pain, lower abdominal pain and RLQ abdominal pain. Denies rebound tenderness. Reports diarrhea. No urinary symptoms.       ORDERS  Labs Reviewed - No data to display    ED Orders (720h ago, onward)      None              Virtual Visit Note: The provider triage portion of this emergency department evaluation and documentation was performed via High[employer], a HIPAA-compliant telemedicine application, in concert with a tele-presenter in the room. A face to face patient evaluation with one of my colleagues will occur once the patient is placed in an emergency department room.      DISCLAIMER: This note was prepared with [employer] voice recognition transcription software. Garbled syntax, mangled pronouns, and other bizarre constructions may be attributed to that software system.

## 2024-10-22 NOTE — ED NOTES
"Patient crying, upset, reports allover pain in abdomen for " years" fred states ongoing pain for " years" reports patient often cries due to pain   "

## 2024-10-22 NOTE — ED NOTES
Patient identifiers verified and correct for Ms Peterson  C/C: Gisselle bush SEE NN  APPEARANCE: awake and alert in NAD. PAIN  10/10  SKIN: warm, dry and intact. No breakdown or bruising. Crying, hysterical   MUSCULOSKELETAL: Patient moving all extremities spontaneously, no obvious swelling or deformities noted. Ambulates independently.  RESPIRATORY: Denies shortness of breath.Respirations unlabored.   CARDIAC: Denies CP, 2+ distal pulses; no peripheral edema  ABDOMEN: ABdomen large, round, reports all over pain   : voids spontaneously, denies difficulty  Neurologic: AAO x 4; follows commands equal strength in all extremities; denies numbness/tingling. Denies dizziness  Deneisnew wekaness

## 2024-10-22 NOTE — ED PROVIDER NOTES
Emergency Department Provider Note    Anne Peterson   24 y.o. female   [id number]      10/22/2024       History     This history was obtained from the patient without limitations.  She presented by personal transportation.      She is a 24-year-old with the below past medical history.  She reports chronic abdominal pain that occurs at least once weekly.  She presents today with abdominal pain that is consistent with prior episodes that began at 04:00 today.  It is located in the periumbilical region.  It was intermittent initially but has been constant and more severe over the past 2 hours.  She is unable to characterize the pain.  She has associated nausea, vomiting, and diarrhea.  She denies hematemesis, hematochezia, and melena.  She denies fever, chills, headache, lightheadedness, dizziness, chest pain, shortness of breath, and dysuria.  She has not taken anything for her symptoms today.  She report that she has been seen by multiple outpatient providers including a gastroenterologist.  She has tried dicyclomine and other prescription and over-the-counter medications but they have all been ineffective.  The only thing that has helped with her abdominal pain has been opioids.         Past Medical History:   Diagnosis Date    Diabetes mellitus     Symptomatic cholelithiasis 04/09/2024      History reviewed. No pertinent surgical history.   No family history on file.   Social History     Socioeconomic History    Marital status: Significant Other   Tobacco Use    Smoking status: Every Day     Current packs/day: 0.50     Types: Cigarettes    Smokeless tobacco: Current   Substance and Sexual Activity    Alcohol use: Never    Drug use: Never    Sexual activity: Yes     Partners: Male     Social Drivers of Health     Financial Resource Strain: Patient Declined (10/11/2024)    Received from [facility]    Overall Financial Resource Strain (CARDIA)     Difficulty of Paying Living Expenses: Patient declined   Food  Insecurity: No Food Insecurity (10/11/2024)    Received from [facility]    Hunger Vital Sign     Worried About Running Out of Food in the Last Year: Never true     Ran Out of Food in the Last Year: Never true   Transportation Needs: Patient Declined (10/11/2024)    Received from [facility]    PRAPARE - Transportation     Lack of Transportation (Medical): Patient declined     Lack of Transportation (Non-Medical): Patient declined   Physical Activity: Insufficiently Active (10/11/2024)    Received from [facility]    Exercise Vital Sign     Days of Exercise per Week: 2 days     Minutes of Exercise per Session: 30 min   Stress: Stress Concern Present (10/11/2024)    Received from [facility]    [employer] - Occupational Stress Questionnaire     Feeling of Stress : Very much   Housing Stability: Unknown (10/11/2024)    Received from [facility]    Housing Stability Vital Sign     Unable to Pay for Housing in the Last Year: No      Review of patient's allergies indicates:   Allergen Reactions    Cephalosporins Anaphylaxis, Hives, Itching, Shortness Of Breath, Rash and Swelling    Sulfa (sulfonamide antibiotics) Anaphylaxis, Hives, Itching, Rash and Swelling           Physical Examination     Initial Vitals [10/22/24 1532]   BP Pulse Resp Temp SpO2   (!) 143/85 84 19 98 °F (36.7 °C) 99 %      MAP       --           Physical Exam    Nursing note and vitals reviewed.  Constitutional: She is not diaphoretic. She is Obese . She appears distressed.   The patient is crying.   HENT: Mouth/Throat: Mucous membranes are normal.   Eyes: Conjunctivae are normal. No scleral icterus.   Cardiovascular:  Normal rate, regular rhythm and normal heart sounds.     Exam reveals no gallop and no friction rub.       No murmur heard.  Pulmonary/Chest: No respiratory distress. She has no wheezes. She has no rhonchi.   Abdominal: Abdomen is soft. She exhibits no distension and no mass. There is generalized abdominal  tenderness. There is no rebound and no guarding.     Neurological: She is alert and oriented to person, place, and time. GCS score is 15. GCS eye subscore is 4. GCS verbal subscore is 5. GCS motor subscore is 6.   Skin: Skin is warm and dry. No pallor.            Labs     Labs Reviewed   CBC W/ AUTO DIFFERENTIAL - Abnormal       Result Value    WBC 12.62      RBC 4.56      Hemoglobin 13.5      Hematocrit 41.3      MCV 91      MCH 29.6      MCHC 32.7      RDW 13.7      Platelets 372      MPV 10.5      Immature Granulocytes 1.0 (*)     Gran # (ANC) 8.1 (*)     Immature Grans (Abs) 0.12 (*)     Lymph # 3.0      Mono # 0.9      Eos # 0.5      Baso # 0.10      nRBC 0      Gran % 63.8      Lymph % 23.9      Mono % 6.9      Eosinophil % 3.6      Basophil % 0.8      Differential Method Automated     COMPREHENSIVE METABOLIC PANEL - Abnormal    Sodium 139      Potassium 4.4      Chloride 110      CO2 19 (*)     Glucose 124 (*)     BUN 15      Creatinine 0.7      Calcium 9.2      Total Protein 7.8      Albumin 3.8      Total Bilirubin 0.2      Alkaline Phosphatase 109      AST 26      ALT 35      eGFR >60.0      Anion Gap 10     URINALYSIS, REFLEX TO URINE CULTURE - Abnormal    Specimen UA Urine, Clean Catch      Color, UA Yellow      Appearance, UA Clear      pH, UA 6.0      Specific Gravity, UA 1.030      Protein, UA Trace (*)     Glucose, UA Negative      Ketones, UA Negative      Bilirubin (UA) Negative      Occult Blood UA Trace (*)     Nitrite, UA Negative      Leukocytes, UA Negative      Narrative:     Specimen Source->Urine   LIPASE    Lipase 29     POCT URINE PREGNANCY    POC Preg Test, Ur Negative       Acceptable Yes     ISTAT CHEM8        Imaging     Imaging Results    None           ED Course     The patient received the following medications:  Medications   droPERidol injection 2.5 mg (2.5 mg Intravenous Given 10/22/24 6671)       Procedures    ED Course as of 10/22/24 2024   Tue Oct 22, 2024    2019 I just reassessed the patient.  She reported that she was feeling much better and requested to be discharged. [LP]      ED Course User Index  [LP] Abbe Centeno III, MD        Medical Decision Making                 Medical Decision Making  Differential diagnoses included abdominal pain nos, cystitis, pregnancy, ectopic pregnancy, IBS, bowel obstruction, pancreatitis, appendicitis, diverticulitis, and other conditions.      The patient had no concerning lab abnormalities.  She received IV droperidol which provided marked relief.    Problems Addressed:  Acute periumbilical pain: acute illness or injury  Nausea, vomiting, and diarrhea: acute illness or injury    Risk  Prescription drug management.              Diagnoses       ICD-10-CM ICD-9-CM   1. Acute periumbilical pain  R10.33 789.05     338.19   2. Nausea, vomiting, and diarrhea  R11.2 787.91    R19.7 787.01         Dispostion      ED Disposition Condition    Discharge Stable            ED Prescriptions    None         Follow-up Information       Follow up With Specialties Details Why Contact Info    Harman Rodríguez MD Family Medicine  Schedule a close in-person or virtual ER follow-up visit with your primary care provider. [address] [place] [address]  [phone]      ER   Return to the ER if your condition worsens or you have any other concerns that you feel need immediate attention.               Abbe Centeno III, MD  10/22/24 2024

## 2024-10-22 NOTE — ED NOTES
Patient identifiers for Anne Peterson 24 y.o. female checked and correct.  Chief Complaint   Patient presents with    Abdominal Pain     Periumbilical radiating to RLQ. Seen and evaluated by PCP and told to come to ED if worsened.      Past Medical History:   Diagnosis Date    Diabetes mellitus      Allergies reported:   Review of patient's allergies indicates:   Allergen Reactions    Cephalosporins Anaphylaxis, Hives, Itching, Shortness Of Breath, Rash and Swelling    Sulfa (sulfonamide antibiotics) Anaphylaxis, Hives, Itching, Rash and Swelling         HEENT: Denies vision changes. Denies ear drainage or hearing loss. No c/o nasal drainage. Denies dysphagia or voice changes.   Appearance: Pt awake, alert & oriented to person, place & time. Pt in no acute distress at present time. Pt is clean and well groomed with clothes appropriately fastened.   Skin: Skin warm, dry & intact. Color consistent with ethnicity. Mucous membranes moist. No breakdown or brusing noted.   Musculoskeletal: Patient moving all extremities well, no obvious swelling or deformities noted.   Respiratory: Respirations spontaneous, even, and non-labored. Visible chest rise noted. Airway is open and patent. No accessory muscle use noted.   Neurologic: Sensation is intact. Speech is clear and appropriate. Eyes open spontaneously, behavior appropriate to situation, follows commands, facial expression symmetrical, bilateral hand grasp equal and even, purposeful motor response noted.  Cardiac: All peripheral pulses present. No Bilateral lower extremity edema. Cap refill is <3 seconds.  Abdomen: Abdomen soft, non distended. Pt's bdomen is tender upon palpation is all quadrants, more severe in the bilateral upper quadrants. Pt also endorses nausea with emesis and diarrhea. Pt state she has had her symptoms for the past year but they became more intense around 8 this morning.   : Pt voids independently, denies dysuria, hematuria, frequency.

## 2024-10-23 NOTE — DISCHARGE INSTRUCTIONS
We know that you have many choices and are honored that you chose us. We hope that we met or exceeded your expectations and goals for this visit and will keep the [facility] family in mind for your future needs and those of your family and friends.     - Dr. Centeno

## 2024-10-23 NOTE — ED NOTES
Assumed care of patient and have received report from nurse. Patient is currently in acute distress. +Anxious, n/v, abdominal pain. Patient has been connected to bp cuff and pulse ox, patient has been changed into a gown as well.

## 2024-10-23 NOTE — ED NOTES
Patient's significant other at bedside at this time. Patient seems to be more at ease, patient has been given an ice pack. Care on going.    show

## 2024-10-28 ENCOUNTER — PATIENT MESSAGE (OUTPATIENT)
Dept: INTERNAL MEDICINE | Facility: CLINIC | Age: 24
End: 2024-10-28
Payer: COMMERCIAL

## 2024-10-28 RX ORDER — CLINDAMYCIN HYDROCHLORIDE 300 MG/1
300 CAPSULE ORAL EVERY 8 HOURS
Qty: 10 CAPSULE | Refills: 1 | Status: SHIPPED | OUTPATIENT
Start: 2024-10-28

## 2024-10-29 ENCOUNTER — TELEPHONE (OUTPATIENT)
Dept: SMOKING CESSATION | Facility: CLINIC | Age: 24
End: 2024-10-29
Payer: MEDICAID

## 2024-11-12 ENCOUNTER — TELEPHONE (OUTPATIENT)
Dept: SURGERY | Facility: CLINIC | Age: 24
End: 2024-11-12
Payer: COMMERCIAL

## 2024-11-12 ENCOUNTER — TELEPHONE (OUTPATIENT)
Dept: SMOKING CESSATION | Facility: CLINIC | Age: 24
End: 2024-11-12
Payer: COMMERCIAL

## 2024-11-12 NOTE — TELEPHONE ENCOUNTER
----- Message from Lovely sent at 11/12/2024  1:16 PM CST -----  Contact: pt @ [phone]  Anne Johnson calling regarding Appointment Access  (message) for #pt is calling to speak with nurse concerning ,surgery on 11/13, asking for call back

## 2024-11-12 NOTE — TELEPHONE ENCOUNTER
[facility]-called patient regarding canceled intake appointment.  Patient states that she is not interested to quit at this time.  Informed patient about future help or support if needed.  Contact information was given.

## 2024-11-12 NOTE — TELEPHONE ENCOUNTER
"Chief Complaint   Patient presents with    Post-Op Complications     Pt states he has increases drainage from abd incision.   Pt was given a \"booster shot of antibiotic\" yesterday from PCP. Today enforings increased testical  and leg swelling. Pt also feels his stoma is leaking because he is unable to properly fit the bag.      /49   Pulse 89   Temp 36.7 °C (98 °F) (Temporal)   Resp 16   Ht 1.702 m (5' 7\")   Wt 79.4 kg (175 lb)   SpO2 100%   BMI 27.41 kg/m²     Hx of Cancer. Placed in family room    Pt is alert/oriented and follows commands. Pt speaking in full sentences and responds appropriately to questions. No acute distress noted in triage and respirations are even and unlabored.      Pt placed in lobby and educated on triage process. Pt encouraged to alert staff for any changes in condition.    Pt would like his port accessed     " Spoke with patient. Pt advise to call the financial department for any questions on payment or deposits.

## 2024-11-26 ENCOUNTER — TELEPHONE (OUTPATIENT)
Dept: SURGERY | Facility: CLINIC | Age: 24
End: 2024-11-26
Payer: COMMERCIAL

## 2024-11-26 NOTE — TELEPHONE ENCOUNTER
----- Message from Raz sent at 11/26/2024  2:52 PM CST -----  Regarding: Missed Post Op  Contact: [phone]    Reschedule    Caller:The pt     Call back number: [phone]    Current Appt Date: Today    Type of Appt: Post Op       Provider:      Reason for Rescheduling: Missed Appt       Additional Information: Thank you.

## 2024-12-05 ENCOUNTER — OFFICE VISIT (OUTPATIENT)
Dept: SURGERY | Facility: CLINIC | Age: 24
End: 2024-12-05

## 2024-12-05 VITALS
HEIGHT: 62 IN | SYSTOLIC BLOOD PRESSURE: 131 MMHG | DIASTOLIC BLOOD PRESSURE: 84 MMHG | WEIGHT: 287.13 LBS | BODY MASS INDEX: 52.84 KG/M2 | HEART RATE: 118 BPM

## 2024-12-05 DIAGNOSIS — G89.18 POST-OP PAIN: Primary | ICD-10-CM

## 2024-12-05 PROCEDURE — 99024 POSTOP FOLLOW-UP VISIT: CPT | Mod: S$PBB,,, | Performed by: SURGERY

## 2024-12-05 PROCEDURE — 99214 OFFICE O/P EST MOD 30 MIN: CPT | Mod: PBBFAC,PN | Performed by: SURGERY

## 2024-12-05 PROCEDURE — 99999 PR PBB SHADOW E&M-EST. PATIENT-LVL IV: CPT | Mod: PBBFAC,,, | Performed by: SURGERY

## 2024-12-05 RX ORDER — HYDROCODONE BITARTRATE AND ACETAMINOPHEN 7.5; 325 MG/1; MG/1
1 TABLET ORAL EVERY 6 HOURS PRN
Qty: 20 TABLET | Refills: 0 | Status: SHIPPED | OUTPATIENT
Start: 2024-12-05

## 2024-12-17 NOTE — PROGRESS NOTES
"Anne Peterson is a 24 y.o. female patient.   1. Post-op pain    Bilateral axillary hidradenitis as well as pilonidal cyst which she had excised 3 weeks ago.    Axillary hidradenitis has healed well on all sutures removed in clinic.    The pilonidal cyst is completely closed at this point.    All sutures removed and she did have slight bit proximally 1.5 cm skin opening in bilateral axilla however this had beginning of granulation tissue and should close on its own.    Discuss this with the patient and she expressed understanding.      Past Medical History:   Diagnosis Date    Diabetes mellitus     Hidradenitis     Symptomatic cholelithiasis 04/09/2024     No past surgical history pertinent negatives on file.  Scheduled Meds:  Continuous Infusions:  PRN Meds:    Review of patient's allergies indicates:   Allergen Reactions    Cephalosporins Anaphylaxis, Hives, Itching, Shortness Of Breath, Rash and Swelling    Sulfa (sulfonamide antibiotics) Anaphylaxis, Hives, Itching, Rash and Swelling     There are no hospital problems to display for this patient.    Blood pressure 131/84, pulse (!) 118, height 5' 2" (1.575 m), weight 130.3 kg (287 lb 2.4 oz), last menstrual period 11/19/2024.    Subjective:   Diet: Adequate intake.  Patient reports no nausea.    Activity level: Returning to normal.    Pain control: Well controlled.    Objective:  Vital signs (most recent): Blood pressure 131/84, pulse (!) 118, height 5' 2" (1.575 m), weight 130.3 kg (287 lb 2.4 oz), last menstrual period 11/19/2024.  General appearance: Comfortable.    Lungs:  Normal effort.    Heart: Normal rate.    Abdomen: Abdomen is soft.    Bowel sounds:  Bowel sounds are normal.    Tenderness: There is no abdominal tenderness tenderness.    Wound:  Clean (Small areas of skin opening in axillary hidradenitis incisions).  There is clear drainage.    Extremities: There is normal range of motion.    Neurological: The patient is alert.    Assessment & " Plan  24-year-old female status post bilateral axillary hidradenitis excision as well as pilonidal cyst excision   Return to clinic p.r.n. for any delay in wound healing.      Ben Schulz MD  12/17/2024

## 2024-12-20 ENCOUNTER — TELEPHONE (OUTPATIENT)
Dept: SURGERY | Facility: CLINIC | Age: 24
End: 2024-12-20
Payer: COMMERCIAL

## 2024-12-20 NOTE — TELEPHONE ENCOUNTER
----- Message from Lovely sent at 12/20/2024  1:48 PM CST -----  Contact: pt @ [phone]  GEOFF JOHNSON calling regarding Appointment Access  (message) for #pt is calling to see if she can get stiches taken out today, asking for call back

## 2024-12-20 NOTE — TELEPHONE ENCOUNTER
Spoke with pt. Pt states she was calling to see if she can get an appointment today to have her stitches removed. Advised pt that Dr Schulz is not in clinic today. Pt states she will keep her appointment for Monday, December 23rd to have stitches removed. All questions answered. Pt verbalized understanding.

## 2024-12-20 NOTE — TELEPHONE ENCOUNTER
----- Message from David sent at 12/20/2024  3:53 PM CST -----  Regarding: Self [phone]  Type:  Patient Returning Call    Who Called:  Self     Who Left Message for Patient:  Karine Prather LPN    Does the patient know what this is regarding?: yes     Would the patient rather a call back or a response via [facility]?  Call back     Best Call Back Number: [phone]     Additional Information:     Thank you.

## 2024-12-23 ENCOUNTER — OFFICE VISIT (OUTPATIENT)
Dept: SURGERY | Facility: CLINIC | Age: 24
End: 2024-12-23

## 2024-12-23 VITALS
BODY MASS INDEX: 52.5 KG/M2 | HEART RATE: 97 BPM | WEIGHT: 287.06 LBS | DIASTOLIC BLOOD PRESSURE: 80 MMHG | SYSTOLIC BLOOD PRESSURE: 123 MMHG

## 2024-12-23 DIAGNOSIS — Z98.890 POST-OPERATIVE STATE: Primary | ICD-10-CM

## 2024-12-23 PROCEDURE — 99999 PR PBB SHADOW E&M-EST. PATIENT-LVL III: CPT | Mod: PBBFAC,,, | Performed by: SURGERY

## 2024-12-23 PROCEDURE — 99213 OFFICE O/P EST LOW 20 MIN: CPT | Mod: PBBFAC,PN | Performed by: SURGERY

## 2024-12-26 NOTE — PROGRESS NOTES
Anne Peterson is a 24 y.o. female patient.   Patient comes for removal of sutures from sacral area.    Still has superficial opening which goes proximally 1.5 cm deep.    0.6 cm wide.    Explain to patient that this should close through time with just minimal packing daily.    She expressed understanding.    The sign of infection.    Otherwise seems to be healing well and all other areas.      No diagnosis found.  Past Medical History:   Diagnosis Date    Diabetes mellitus     Hidradenitis     Symptomatic cholelithiasis 04/09/2024     No past surgical history pertinent negatives on file.  Scheduled Meds:  Continuous Infusions:  PRN Meds:    Review of patient's allergies indicates:   Allergen Reactions    Cephalosporins Anaphylaxis, Hives, Itching, Shortness Of Breath, Rash and Swelling    Sulfa (sulfonamide antibiotics) Anaphylaxis, Hives, Itching, Rash and Swelling     There are no hospital problems to display for this patient.    Blood pressure 123/80, pulse 97, weight 130.2 kg (287 lb 0.6 oz), last menstrual period 11/19/2024.    Subjective:   Diet: Adequate intake.  Patient reports no nausea.    Activity level: Returning to normal.    Pain control: Well controlled.    Objective:  Vital signs (most recent): Blood pressure 123/80, pulse 97, weight 130.2 kg (287 lb 0.6 oz), last menstrual period 11/19/2024.  General appearance: Comfortable.    Lungs:  Normal effort.    Heart: Normal rate.    Abdomen: Abdomen is soft.    Bowel sounds:  Bowel sounds are normal.    Tenderness: There is no abdominal tenderness tenderness.    Wound:  Clean.  Positive for dehiscence (skin).  There is clear drainage.    Extremities: There is normal range of motion.    Neurological: The patient is alert.    Assessment & Plan  24-year-old female status post pilonidal cyst excision and hidradenitis.    Sutures removed in clinic today.    Explain patient local wound care.    If wound does not close over the next month told patient to come  back and we would consider either wound VAC placement or wound closure.      Ben Schulz MD  12/26/2024

## 2025-04-14 ENCOUNTER — LAB VISIT (OUTPATIENT)
Dept: LAB | Facility: HOSPITAL | Age: 25
End: 2025-04-14
Payer: OTHER GOVERNMENT

## 2025-04-14 ENCOUNTER — OFFICE VISIT (OUTPATIENT)
Dept: INTERNAL MEDICINE | Facility: CLINIC | Age: 25
End: 2025-04-14
Payer: OTHER GOVERNMENT

## 2025-04-14 VITALS
BODY MASS INDEX: 52.83 KG/M2 | SYSTOLIC BLOOD PRESSURE: 114 MMHG | DIASTOLIC BLOOD PRESSURE: 76 MMHG | OXYGEN SATURATION: 96 % | HEART RATE: 116 BPM | WEIGHT: 287.06 LBS | HEIGHT: 62 IN

## 2025-04-14 DIAGNOSIS — F17.200 SMOKER: ICD-10-CM

## 2025-04-14 DIAGNOSIS — R53.83 FATIGUE DUE TO DEPRESSION: ICD-10-CM

## 2025-04-14 DIAGNOSIS — R10.9 ABDOMINAL PAIN, UNSPECIFIED ABDOMINAL LOCATION: ICD-10-CM

## 2025-04-14 DIAGNOSIS — F31.9 BIPOLAR AFFECTIVE DISORDER, REMISSION STATUS UNSPECIFIED: ICD-10-CM

## 2025-04-14 DIAGNOSIS — R74.01 TRANSAMINITIS: ICD-10-CM

## 2025-04-14 DIAGNOSIS — R10.10 PAIN OF UPPER ABDOMEN: ICD-10-CM

## 2025-04-14 DIAGNOSIS — R19.5 LOOSE STOOLS: ICD-10-CM

## 2025-04-14 DIAGNOSIS — F32.A FATIGUE DUE TO DEPRESSION: ICD-10-CM

## 2025-04-14 DIAGNOSIS — Z13.1 SCREENING FOR DIABETES MELLITUS: ICD-10-CM

## 2025-04-14 DIAGNOSIS — Z13.1 SCREENING FOR DIABETES MELLITUS: Primary | ICD-10-CM

## 2025-04-14 LAB
25(OH)D3+25(OH)D2 SERPL-MCNC: 19 NG/ML (ref 30–96)
ABSOLUTE EOSINOPHIL (OHS): 0.49 K/UL
ABSOLUTE MONOCYTE (OHS): 0.91 K/UL (ref 0.3–1)
ABSOLUTE NEUTROPHIL COUNT (OHS): 7.31 K/UL (ref 1.8–7.7)
ALBUMIN SERPL BCP-MCNC: 3.6 G/DL (ref 3.5–5.2)
ALP SERPL-CCNC: 113 UNIT/L (ref 40–150)
ALT SERPL W/O P-5'-P-CCNC: 62 UNIT/L (ref 10–44)
ANION GAP (OHS): 8 MMOL/L (ref 8–16)
AST SERPL-CCNC: 36 UNIT/L (ref 11–45)
BASOPHILS # BLD AUTO: 0.13 K/UL
BASOPHILS NFR BLD AUTO: 1.1 %
BILIRUB SERPL-MCNC: 0.2 MG/DL (ref 0.1–1)
BUN SERPL-MCNC: 7 MG/DL (ref 6–20)
CALCIUM SERPL-MCNC: 9.1 MG/DL (ref 8.7–10.5)
CHLORIDE SERPL-SCNC: 103 MMOL/L (ref 95–110)
CO2 SERPL-SCNC: 25 MMOL/L (ref 23–29)
CREAT SERPL-MCNC: 0.7 MG/DL (ref 0.5–1.4)
EAG (OHS): 177 MG/DL (ref 68–131)
ERYTHROCYTE [DISTWIDTH] IN BLOOD BY AUTOMATED COUNT: 13.1 % (ref 11.5–14.5)
GFR SERPLBLD CREATININE-BSD FMLA CKD-EPI: >60 ML/MIN/1.73/M2
GLUCOSE SERPL-MCNC: 231 MG/DL (ref 70–110)
HBA1C MFR BLD: 7.8 % (ref 4–5.6)
HCT VFR BLD AUTO: 42.5 % (ref 37–48.5)
HGB BLD-MCNC: 13.5 GM/DL (ref 12–16)
IMM GRANULOCYTES # BLD AUTO: 0.08 K/UL (ref 0–0.04)
IMM GRANULOCYTES NFR BLD AUTO: 0.7 % (ref 0–0.5)
LYMPHOCYTES # BLD AUTO: 3.1 K/UL (ref 1–4.8)
MCH RBC QN AUTO: 29.4 PG (ref 27–31)
MCHC RBC AUTO-ENTMCNC: 31.8 G/DL (ref 32–36)
MCV RBC AUTO: 93 FL (ref 82–98)
NUCLEATED RBC (/100WBC) (OHS): 0 /100 WBC
PLATELET # BLD AUTO: 331 K/UL (ref 150–450)
PMV BLD AUTO: 11.5 FL (ref 9.2–12.9)
POTASSIUM SERPL-SCNC: 3.9 MMOL/L (ref 3.5–5.1)
PROT SERPL-MCNC: 7.4 GM/DL (ref 6–8.4)
RBC # BLD AUTO: 4.59 M/UL (ref 4–5.4)
RELATIVE EOSINOPHIL (OHS): 4.1 %
RELATIVE LYMPHOCYTE (OHS): 25.8 % (ref 18–48)
RELATIVE MONOCYTE (OHS): 7.6 % (ref 4–15)
RELATIVE NEUTROPHIL (OHS): 60.7 % (ref 38–73)
SODIUM SERPL-SCNC: 136 MMOL/L (ref 136–145)
WBC # BLD AUTO: 12.02 K/UL (ref 3.9–12.7)

## 2025-04-14 PROCEDURE — 99999 PR PBB SHADOW E&M-EST. PATIENT-LVL V: CPT | Mod: PBBFAC,,,

## 2025-04-14 PROCEDURE — 83036 HEMOGLOBIN GLYCOSYLATED A1C: CPT

## 2025-04-14 PROCEDURE — 36415 COLL VENOUS BLD VENIPUNCTURE: CPT

## 2025-04-14 PROCEDURE — 82306 VITAMIN D 25 HYDROXY: CPT

## 2025-04-14 PROCEDURE — 85025 COMPLETE CBC W/AUTO DIFF WBC: CPT

## 2025-04-14 PROCEDURE — 80053 COMPREHEN METABOLIC PANEL: CPT

## 2025-04-14 PROCEDURE — 99215 OFFICE O/P EST HI 40 MIN: CPT | Mod: PBBFAC

## 2025-04-14 RX ORDER — TRAZODONE HYDROCHLORIDE 50 MG/1
25 TABLET ORAL NIGHTLY
Qty: 15 TABLET | Refills: 11 | Status: SHIPPED | OUTPATIENT
Start: 2025-04-14 | End: 2026-04-14

## 2025-04-14 RX ORDER — QUETIAPINE FUMARATE 25 MG/1
25 TABLET, FILM COATED ORAL DAILY
Qty: 30 TABLET | Refills: 11 | Status: CANCELLED | OUTPATIENT
Start: 2025-04-14 | End: 2026-04-14

## 2025-04-14 RX ORDER — SEMAGLUTIDE 0.68 MG/ML
0.5 INJECTION, SOLUTION SUBCUTANEOUS
Qty: 3 ML | Refills: 11 | Status: SHIPPED | OUTPATIENT
Start: 2025-04-14 | End: 2026-04-14

## 2025-04-14 NOTE — PROGRESS NOTES
"Subjective     Chief Complaint: Hospital Follow up    History of Present Illness:  Ms. Anne Peterson is a 24 y.o. female w/ BMI 53, nonspecific GI pain and endometeriaosis.       Headache, Sleep, pain in abdomen and depression.     Constant feeling of hunger pains and going on a roller coaster, stabbing and punching. Middle of the abdomen. Worse in the lower b/l quadrants. No medication, helps, pressure on the belly helps.     9/10 depression. Hx of SA 14. States would go to the hospital.     Significant Med HX  Anxiety  PCOS  PID  "Intestional inflammation"  Fibroid    Current Meds  Escitalopram, otc ibupofen tylenol once or twice a week. Takes for headache.     Failed: Lexapro, wellbutrin.     Allergies  Sulfa and Cephlosporins  Gabapentin  Tylenol PM    Surgical Hx  Colonscopy 10/23    Family Hx  UC/Chrons  Stomach Cancer  Celiacs  Eso Cancer  Rectal Cancer  Mom w/ 22 different abdominal surgeries.     Tobacco  Smoke 1/2 PPD  Trying to quit    Alcohol  Denies    Drugs  No    Social  Engaged  Just 1, at least.     Review of Systems   Constitutional:  Negative for chills and fever.   HENT:  Positive for hearing loss.    Respiratory:  Positive for cough and wheezing. Negative for shortness of breath.    Cardiovascular:  Positive for chest pain (w/ drinking). Negative for palpitations.   Gastrointestinal:  Positive for abdominal pain, blood in stool (dark red), constipation, diarrhea, heartburn, nausea and vomiting.   Genitourinary:  Negative for dysuria, frequency and urgency.   Skin: Negative.    Neurological:         Itching   Psychiatric/Behavioral:  Positive for depression. The patient is nervous/anxious.          PAST HISTORY:     Past Medical History:   Diagnosis Date    Diabetes mellitus     Hidradenitis     Symptomatic cholelithiasis 04/09/2024       Past Surgical History:   Procedure Laterality Date    AXILLARY HIDRADENITIS EXCISION Bilateral 11/13/2024    EXCISION OF HIDRADENITIS Bilateral 11/13/2024    " Procedure: EXCISION, HIDRADENITIS, bilateral axilla, sacrum;  Surgeon: Ben Schulz MD;  Location: [facility];  Service: General;  Laterality: Bilateral;    SURGICAL REMOVAL OF PILONIDAL CYST  11/13/2024    WISDOM TOOTH EXTRACTION         No family history on file.    Social History     Socioeconomic History    Marital status:    Tobacco Use    Smoking status: Every Day     Current packs/day: 0.50     Types: Cigarettes    Smokeless tobacco: Current   Substance and Sexual Activity    Alcohol use: Never    Drug use: Never    Sexual activity: Yes     Partners: Male     Social Drivers of Health     Financial Resource Strain: Patient Declined (10/11/2024)    Received from [facility]    Overall Financial Resource Strain (CARDIA)     Difficulty of Paying Living Expenses: Patient declined   Food Insecurity: No Food Insecurity (10/11/2024)    Received from [facility]    Hunger Vital Sign     Worried About Running Out of Food in the Last Year: Never true     Ran Out of Food in the Last Year: Never true   Transportation Needs: Patient Declined (10/11/2024)    Received from [facility]    PRAPARE - Transportation     Lack of Transportation (Medical): Patient declined     Lack of Transportation (Non-Medical): Patient declined   Physical Activity: Insufficiently Active (10/11/2024)    Received from [facility]    Exercise Vital Sign     Days of Exercise per Week: 2 days     Minutes of Exercise per Session: 30 min   Stress: Stress Concern Present (10/11/2024)    Received from [facility]    [employer] - Occupational Stress Questionnaire     Feeling of Stress : Very much   Housing Stability: Unknown (10/11/2024)    Received from [facility]    Housing Stability Vital Sign     Unable to Pay for Housing in the Last Year: No       MEDICATIONS & ALLERGIES:     Current Outpatient Medications on File Prior to Visit   Medication Sig    ciprofloxacin HCl (CIPRO) 500 MG tablet Take 1 tablet  "(500 mg total) by mouth 2 (two) times daily. (Patient not taking: Reported on 8/30/2024)    diclofenac sodium (SOLARAZE) 3 % gel Apply 1 Application topically 2 (two) times daily. (Patient not taking: Reported on 8/30/2024)    drospirenone-ethinyl estradioL (JOELLEN) 3-0.02 mg per tablet Take 1 tablet by mouth once daily.    EScitalopram oxalate (LEXAPRO) 10 MG tablet Take 1 tablet by mouth once daily.    HYDROcodone-acetaminophen (NORCO) 7.5-325 mg per tablet Take 1 tablet by mouth every 6 (six) hours as needed for Pain.    HYDROcodone-acetaminophen (NORCO) 7.5-325 mg per tablet Take 1 tablet by mouth every 6 (six) hours as needed for Pain.    hydrOXYzine pamoate (VISTARIL) 25 MG Cap Take 25 mg by mouth 3 (three) times daily. (Patient not taking: Reported on 8/30/2024)    ibuprofen (ADVIL,MOTRIN) 600 MG tablet Take 1 tablet (600 mg total) by mouth as needed for Pain. (Patient not taking: Reported on 8/30/2024)    polyethylene glycol (GLYCOLAX) 17 gram PwPk Take 17 g by mouth once daily. (Patient not taking: Reported on 9/20/2024)    SENNA 8.6 mg tablet Take 1 tablet by mouth once daily.     No current facility-administered medications on file prior to visit.       Review of patient's allergies indicates:   Allergen Reactions    Cephalosporins Anaphylaxis, Hives, Itching, Shortness Of Breath, Rash and Swelling    Sulfa (sulfonamide antibiotics) Anaphylaxis, Hives, Itching, Rash and Swelling       OBJECTIVE:     Vital Signs:  Vitals:    04/14/25 1558   BP: 114/76   BP Location: Right arm   Patient Position: Sitting   Pulse: (!) 116   SpO2: 96%   Weight: 130.2 kg (287 lb 0.6 oz)   Height: 5' 2" (1.575 m)         Body mass index is 52.5 kg/m².     Physical Exam  Vitals and nursing note reviewed. Exam conducted with a chaperone present.   Constitutional:       General: She is not in acute distress.     Appearance: She is obese. She is not ill-appearing, toxic-appearing or diaphoretic.      Comments: Smelled of smoke   HENT: "      Head: Normocephalic and atraumatic.      Right Ear: External ear normal.      Left Ear: External ear normal.      Mouth/Throat:      Mouth: Mucous membranes are moist.      Pharynx: Oropharynx is clear. No oropharyngeal exudate.   Eyes:      General: No scleral icterus.     Extraocular Movements: Extraocular movements intact.      Conjunctiva/sclera: Conjunctivae normal.   Neck:      Vascular: No JVD.   Cardiovascular:      Rate and Rhythm: Normal rate and regular rhythm.      Pulses: Normal pulses.      Heart sounds: Normal heart sounds. No murmur heard.     No friction rub.   Pulmonary:      Effort: Pulmonary effort is normal. No respiratory distress.      Breath sounds: Wheezing present. No rales.   Abdominal:      General: Bowel sounds are normal. There is no distension.      Palpations: Abdomen is soft. There is no mass.      Tenderness: There is abdominal tenderness (Diffuse). There is no rebound.   Musculoskeletal:         General: No tenderness or signs of injury. Normal range of motion.      Cervical back: Normal range of motion and neck supple.      Right lower leg: No edema.      Left lower leg: No edema.   Skin:     General: Skin is warm and dry.      Coloration: Skin is not jaundiced or pale.      Findings: No erythema.   Neurological:      General: No focal deficit present.      Mental Status: She is alert and oriented to person, place, and time.   Psychiatric:         Mood and Affect: Mood and affect normal.         Behavior: Behavior normal.         Thought Content: Thought content normal.         Judgment: Judgment normal.         Laboratory  No results found for this or any previous visit (from the past 24 hours).    Diagnostic Results:      Health Maintenance Due   Topic Date Due    Diabetes Urine Screening  Never done    Foot Exam  Never done    Diabetic Eye Exam  Never done    HPV Vaccines (1 - 3-dose series) Never done    TETANUS VACCINE  Never done    Pneumococcal Vaccines (Age 0-49) (1  of 2 - PCV) Never done    Pap Smear  Never done    Influenza Vaccine (1) Never done    COVID-19 Vaccine (1 - 2024-25 season) Never done         ASSESSMENT & PLAN:     1. Screening for diabetes mellitus  -     Hemoglobin A1C; Future; Expected date: 04/14/2025    2. Fatigue due to depression  -     CBC Auto Differential; Future; Expected date: 04/14/2025  -     Comprehensive Metabolic Panel; Future; Expected date: 04/14/2025  -     Ambulatory referral/consult to Psychiatry; Future; Expected date: 04/21/2025  -     Vitamin D; Future; Expected date: 04/14/2025  -     Ambulatory referral/consult to Sleep Disorders; Future; Expected date: 04/21/2025    3. Pain of upper abdomen  -     CBC Auto Differential; Future; Expected date: 04/14/2025  -     Comprehensive Metabolic Panel; Future; Expected date: 04/14/2025  -     Ambulatory referral/consult to Pain Clinic; Future; Expected date: 04/21/2025    4. Smoker  -     Ambulatory referral/consult to Smoking Cessation Program; Future; Expected date: 04/21/2025    5. BMI 50.0-59.9, adult  -     Ambulatory referral/consult to Sleep Disorders; Future; Expected date: 04/21/2025  -     Ambulatory referral/consult to Bariatric/Obesity Medicine; Future; Expected date: 04/21/2025  -     Ambulatory referral/consult to Bariatric Surgery; Future; Expected date: 04/21/2025    Other orders  -     semaglutide (OZEMPIC) 0.25 mg or 0.5 mg (2 mg/3 mL) pen injector; Inject 0.5 mg into the skin every 7 days.  Dispense: 3 mL; Refill: 11  -     traZODone (DESYREL) 50 MG tablet; Take 0.5 tablets (25 mg total) by mouth every evening.  Dispense: 15 tablet; Refill: 11      Lost fo follow up with multiple providers d/t insurance issues. Saw OB, who started lexapro, but given patients bipolar, this is not a good medication for her. Will Re order psych ref.     GLP for weight loss + PCOS  Sleep clinic for JOCELYN, Trazadone for sleep  [facility]surgery for weight loss  Pain clinic d/t back pain and knee pain  and request for opiates    Smoking cessation for smoking.     Basic labs + vitamin D.     Wilfred to schedule own General surgery, GI appt for non specific abdominal pain and loss to follow up.    Discussed with Dr Lewis -- staff attestation to follow      Burke Forrest DO MPH  Internal Medicine PGY-2  [facility]

## 2025-04-16 ENCOUNTER — TELEPHONE (OUTPATIENT)
Dept: INTERNAL MEDICINE | Facility: CLINIC | Age: 25
End: 2025-04-16
Payer: OTHER GOVERNMENT

## 2025-04-16 PROBLEM — F31.9 BIPOLAR AFFECTIVE DISORDER: Status: ACTIVE | Noted: 2025-04-16

## 2025-04-16 RX ORDER — ERGOCALCIFEROL 1.25 MG/1
50000 CAPSULE ORAL
Qty: 4 CAPSULE | Refills: 1 | Status: SHIPPED | OUTPATIENT
Start: 2025-04-16 | End: 2025-06-05

## 2025-04-17 NOTE — PROGRESS NOTES
I have reviewed the notes, assessments, and/or procedures performed by Dr. Forrest, I concur with his documentation of Anne Peterson.  Date of Service: 4/14/2025

## 2025-04-25 ENCOUNTER — TELEPHONE (OUTPATIENT)
Dept: ENDOSCOPY | Facility: HOSPITAL | Age: 25
End: 2025-04-25
Payer: OTHER GOVERNMENT

## 2025-04-25 ENCOUNTER — OFFICE VISIT (OUTPATIENT)
Dept: GASTROENTEROLOGY | Facility: CLINIC | Age: 25
End: 2025-04-25
Payer: OTHER GOVERNMENT

## 2025-04-25 VITALS — HEIGHT: 61 IN | BODY MASS INDEX: 54.07 KG/M2 | WEIGHT: 286.38 LBS

## 2025-04-25 VITALS — WEIGHT: 286.38 LBS | HEIGHT: 61 IN | BODY MASS INDEX: 54.07 KG/M2

## 2025-04-25 DIAGNOSIS — R10.9 ABDOMINAL PAIN, UNSPECIFIED ABDOMINAL LOCATION: Primary | ICD-10-CM

## 2025-04-25 DIAGNOSIS — K21.9 GASTROESOPHAGEAL REFLUX DISEASE, UNSPECIFIED WHETHER ESOPHAGITIS PRESENT: ICD-10-CM

## 2025-04-25 DIAGNOSIS — R19.5 DARK STOOLS: ICD-10-CM

## 2025-04-25 DIAGNOSIS — K92.1 MELENA: ICD-10-CM

## 2025-04-25 DIAGNOSIS — R19.5 LOOSE STOOLS: ICD-10-CM

## 2025-04-25 PROCEDURE — 99214 OFFICE O/P EST MOD 30 MIN: CPT | Mod: PBBFAC

## 2025-04-25 PROCEDURE — 99999 PR PBB SHADOW E&M-EST. PATIENT-LVL IV: CPT | Mod: PBBFAC,,,

## 2025-04-25 RX ORDER — OMEPRAZOLE 40 MG/1
40 CAPSULE, DELAYED RELEASE ORAL DAILY
Qty: 90 CAPSULE | Refills: 3 | Status: SHIPPED | OUTPATIENT
Start: 2025-04-25 | End: 2026-04-25

## 2025-04-25 NOTE — TELEPHONE ENCOUNTER
"----- Message from YAW Manuel sent at 4/25/2025  2:47 PM CDT -----  Regarding: EGD  Procedure: EGDDiagnosis: Abdominal pain and GERD, melenaProcedure Timing: Within 4 weeks (Urgent)#If within 4 weeks selected, please rere as high priority##If greater than 12 weeks, please select "5-12 weeks" and delay sending until 3 months prior to requested date# Location: Hospital Based ([facility] [place] [facility]-Endo, [facility], [facility])Additional Scheduling Information: BMI >50Prep Specifications:N/AIs the patient taking a GLP-1 Agonist:noHave you attached a patient to this message: yes  ----- Message -----  From: Law Tobin FNP-C  Sent: 4/25/2025   2:48 PM CDT  To: [facility]  Subject: EGD                                              Procedure: EGDDiagnosis: Abdominal pain and GERDProcedure Timing: Within 4 weeks (Urgent)#If within 4 weeks selected, please rere as high priority##If greater than 12 weeks, please select "5-12 weeks" and delay sending until 3 months prior to requested date# Location: Hospital Based ([facility] [place] [facility]-End[facility], [facility], [facility])Additional Scheduling Information: BMI >50Prep Specifications:N/AIs the patient taking a GLP-1 Agonist:noHave you attached a patient to this message: yes  "

## 2025-04-25 NOTE — PATIENT INSTRUCTIONS
For GERD/Reflux:     Take your PPI 30-45 minutes before your first protein containing meal (breakfast) every day. Take once daily for 8 weeks. If symptoms improve ok discontinue an use PPI as needed for symptoms.      Take Pepcid 20mg in the evening before bedtime to help with nocturnal symptoms, as needed.     Remain upright for at least 3 hours after eating.      Elevate the head of the bed for nighttime.      Avoid foods that you have noticed make your symptoms worse (possible triggers include: peppermint, alcohol, chocolate, caffeine, spicy foods, greasy/fried foods, acidic foods-citrus).    Lose weight if you are overweight -- of all of the lifestyle changes you can make, this one is the most effective.    Follow up in 8 weeks with provider to assess symptoms and ability to taper off PPI (can be a virtual visit).

## 2025-04-25 NOTE — TELEPHONE ENCOUNTER
Referral for procedure from [facility]      Spoke to Anne to schedule procedure(s) Upper Endoscopy (EGD)       Physician to perform procedure(s) Dr. PRICILA Judge  Date of Procedure (s) 5/5/25  Arrival Time 2:30 PM  Time of Procedure(s) 3:30 PM   Location of Procedure(s) [address]  Type of Rx Prep sent to patient: Other  Instructions provided to patient via Copy in hand    Patient was informed on the following information and verbalized understanding. Screening questionnaire reviewed with patient and complete. If procedure requires anesthesia, a responsible adult needs to be present to accompany the patient home, patient cannot drive after receiving anesthesia. Appointment details are tentative, especially check-in time. Patient will receive a prep-op call 7 days prior to confirm check-in time for procedure. If applicable the patient should contact their pharmacy to verify Rx for procedure prep is ready for pick-up. Patient was advised to call the scheduling department at [phone] if pharmacy states no Rx is available. Patient was advised to call the endoscopy scheduling department if any questions or concerns arise.       Endoscopy Scheduling Department

## 2025-04-25 NOTE — TELEPHONE ENCOUNTER
"----- Message from YAW Manuel sent at 4/25/2025  2:47 PM CDT -----  Regarding: EGD  Procedure: EGDDiagnosis: Abdominal pain and GERD, melenaProcedure Timing: Within 4 weeks (Urgent)#If within 4 weeks selected, please rere as high priority##If greater than 12 weeks, please select "5-12 weeks" and delay sending until 3 months prior to requested date# Location: Hospital Based ([facility] [place] [facility]-End[facility], [facility], [facility])Additional Scheduling Information: BMI >50Prep Specifications:N/AIs the patient taking a GLP-1 Agonist:noHave you attached a patient to this message: yes  ----- Message -----  From: Law Tobin FNP-C  Sent: 4/25/2025   2:48 PM CDT  To: [facility]  Subject: EGD                                              Procedure: EGDDiagnosis: Abdominal pain and GERDProcedure Timing: Within 4 weeks (Urgent)#If within 4 weeks selected, please rere as high priority##If greater than 12 weeks, please select "5-12 weeks" and delay sending until 3 months prior to requested date# Location: Hospital Based ([facility] [place] [facility]-End[facility], [facility], [facility])Additional Scheduling Information: BMI >50Prep Specifications:N/AIs the patient taking a GLP-1 Agonist:noHave you attached a patient to this message: yes  "

## 2025-04-25 NOTE — PROGRESS NOTES
"                                                                        Gastroenterology Clinic Consultation Note    Reason for Visit:  The primary encounter diagnosis was Abdominal pain, unspecified abdominal location. Diagnoses of Gastroesophageal reflux disease, unspecified whether esophagitis present, Loose stools, and Dark stools were also pertinent to this visit.    PCP:   Harman Rodríguez         Initial HPI   This is a 24 y.o. female presenting for abdominal pain, GERD and loose stools. States that she has been having stomach issues since the age of 7 or 8. Her pain has become progressively worse and is constant. Its an all over everything pain. Sometimes its sharp, stabbing or punching, etc. She states she has gallstones and when she went to have surgery the provider said "never mind". She was also told she has intestinal inflammation. If she does any physical activity, her stomach gets swollen, hard as a rock and then she vomits. States she has been see several times in the ER for this and a cause is never found. The only thing that helps the pain is edibles and pain medications. She has reflux all of the time and has 2 large bottles of Pepcid chewables next to her bed. She has seen dark red blood in her stools before. She denies any NSAID use or dysphagia. She has never had an EGD performed. Last colonoscopy completed by [facility] in 2023 negative except for hemorrhoids. She reports her stools are all over the place. She suffers from constipation, diarrhea and normal stools. She does mostly have diarrhea. Her sister has Crohn's disease and her mother has had 19 abdominal surgeries. PCP did refer to pain management, bariatrics, sleep medicine, psychiatry and smoking cessation.     Abdominal Pain  How Long: Since the age of 7 or 8   Frequency: daily   Location: All over   Quality: everything   Radiate: Yes to back   Aggravated or Improved with bowel movement  /eating/ movement Improved- edibles or pain " "medications  Aggravated- Physical activity   Medications tried:  Tramadol, hydrocodone   Nausea/Vomiting/Unexplained Weight Loss: Nausea and vomiting- not very often but if she does to much  physical stuff   Pyrosis/Reflux/Dysphagia: Reflux all the time- has 2 giant bottles of Pepcid   Bowel Movements: Constipation, soft stools, normal, diarrhea   Melena/Hematochezia: Dark red    Symptoms: no   GLP-1s:  Trying to get ozempic   NSAIDs:  Ibuprofen- sometimes   Anticoagulation or Antiplatelet: no   History of H.pylori: no   Prior Colonoscopy: 2024     Prior Upper Endoscopy: no   Family h/o Crohn's  Sister and sister dad   Ulcerative Colitis: no   Family h/o Celiac Sprue: no   Family h/o Colon Cancer:     mom   Abdominal Surgeries: No          ROS:  Review of Systems   Constitutional:  Negative for chills and fever.   Respiratory:  Negative for cough and shortness of breath.    Cardiovascular:  Negative for chest pain.   Gastrointestinal:  Positive for abdominal pain, constipation, diarrhea, heartburn, melena, nausea and vomiting. Negative for blood in stool.   Skin:  Negative for rash.   Neurological:  Negative for seizures, loss of consciousness and weakness.        Medical History:  has a past medical history of Diabetes mellitus, Hidradenitis, and Symptomatic cholelithiasis (04/09/2024).    Surgical History:  has a past surgical history that includes [place] tooth extraction; Axillary hidradenitis excision (Bilateral, 11/13/2024); Surgical removal of pilonidal cyst (11/13/2024); and Excision of hidradenitis (Bilateral, 11/13/2024).    Family History: family history is not on file..       Review of patient's allergies indicates:   Allergen Reactions    Cephalosporins Anaphylaxis, Hives, Itching, Shortness Of Breath, Rash and Swelling    Sulfa (sulfonamide antibiotics) Anaphylaxis, Hives, Itching, Rash and Swelling       Medications Ordered Prior to Encounter[1]      Objective Findings:    Vital Signs:  Ht 5' 1" " (1.549 m)   Wt 129.9 kg (286 lb 6 oz)   BMI 54.11 kg/m²   Body mass index is 54.11 kg/m².    Physical Exam:  Physical Exam  Vitals and nursing note reviewed.   Constitutional:       General: She is not in acute distress.     Appearance: Normal appearance. She is obese. She is not ill-appearing.   HENT:      Head: Normocephalic and atraumatic.   Eyes:      Extraocular Movements: Extraocular movements intact.   Cardiovascular:      Rate and Rhythm: Normal rate and regular rhythm.   Pulmonary:      Effort: Pulmonary effort is normal. No respiratory distress.   Abdominal:      General: Bowel sounds are normal. There is no distension.      Palpations: Abdomen is soft.      Tenderness: There is abdominal tenderness.   Neurological:      General: No focal deficit present.      Mental Status: She is alert and oriented to person, place, and time.   Psychiatric:         Mood and Affect: Mood normal.         Behavior: Behavior normal.             Labs:  Lab Results   Component Value Date    WBC 12.02 04/14/2025    HGB 13.5 04/14/2025    HCT 42.5 04/14/2025     04/14/2025    CHOL 147 10/10/2024    TRIG 266 (H) 10/10/2024    HDL 35 (L) 10/10/2024    ALKPHOS 113 04/14/2025    LIPASE 29 10/22/2024    ALT 62 (H) 04/14/2025    AST 36 04/14/2025     04/14/2025    K 3.9 04/14/2025     04/14/2025    CREATININE 0.7 04/14/2025    BUN 7 04/14/2025    CO2 25 04/14/2025    TSH 1.450 10/10/2024    INR 0.9 09/01/2023    HGBA1C 7.8 (H) 04/14/2025       Imaging reviewed: CT Abdomen Pelvis 02/15/2024  FINDINGS:  Lung Bases: Clear.     Heart: Heart size is normal.  No pericardial effusion.     Liver: The liver is enlarged and demonstrates diffuse hypoattenuation compatible with hepatic steatosis.  There are no focal lesions.  The portal vasculature is patent.     Biliary tract: No intrahepatic or extrahepatic biliary ductal dilatation.     Gallbladder: No radiodense gallstone. No wall thickening or pericholecystic fluid.      Pancreas: Normal. No pancreatic ductal dilatation.     Spleen: Normal size without focal lesion.     Adrenals: Unremarkable.     Kidneys and urinary collecting systems: Normal.  No hydronephrosis or urolithiasis.     Lymph nodes: None enlarged.     Stomach and bowel: The stomach is normal.  Loops of small and large bowel are normal in caliber without evidence for inflammation or obstruction.  The appendix is normal.     Peritoneum and mesentery: No ascites or free intraperitoneal air.  No abdominal fluid collection.     Vasculature: No aneurysm or significant atherosclerosis.     Urinary bladder: No wall thickening.     Reproductive organs: The uterus and adnexae are unremarkable.     Body wall: No abnormality.     Musculoskeletal: No aggressive osseous lesion.  Presumed bone island in the left femoral neck.     Impression:     No acute abnormality of the abdomen or pelvis.     Hepatic steatosis and hepatomegaly.        Electronically signed by:Floyd Kent  Date:                                            02/15/2024  Time:                                           23:42    Endoscopy reviewed: Negative colonoscopy by [facility] 10/09/2023      Assessment:  1. Abdominal pain, unspecified abdominal location    2. Gastroesophageal reflux disease, unspecified whether esophagitis present    3. Loose stools    4. Dark stools      Orders Placed This Encounter    Clostridium difficile EIA    Stool culture    H. pylori antigen, stool    Pancreatic elastase, fecal    Fecal fat, qualitative    WBC, Stool    Rotavirus antigen, stool    Adenovirus Molecular Detection, PCR, Non-Blood Stool    Calprotectin, Stool    Giardia / Cryptosporidum, EIA    Stool Exam-Ova,Cysts,Parasites    Ambulatory referral/consult to General Surgery    omeprazole (PRILOSEC) 40 MG capsule         Plan:  Will place referral for EGD to evaluate. She does have referral placed by PCP for pain management. Referral for EGD. Make sure to get US abdomen  ordered by PCP.   Start taking Omeprazole 40 mg daily 30-45 minutes before first meal of the day for 8 weeks. Referral for EGD.  Stool studies to assess for possible acute/infectious/inflammatory processes for loose stools.  Referral for EGD.   Has been seen by [facility] for gallbladder issues and was supposed to have surgery but it was canceled. States she would like to stay with [facility]. Referral to general surgery.    Follow up after scope.    I spent a total of 48 minutes on the day of the visit.This includes face to face time and non-face to face time preparing to see the patient (eg, review of tests), obtaining and/or reviewing separately obtained history, documenting clinical information in the electronic or other health record, independently interpreting results and communicating results to the patient/family/caregiver, or care coordinator.   Thank you for allowing me to participate in this patient's care.    Sincerely,    VIN LYNCH-C  Gastroenterology Department  [facility]  [phone]           [1]   Current Outpatient Medications on File Prior to Visit   Medication Sig Dispense Refill    diclofenac sodium (SOLARAZE) 3 % gel Apply 1 Application topically 2 (two) times daily. (Patient not taking: Reported on 8/30/2024)      drospirenone-ethinyl estradioL (JOELLEN) 3-0.02 mg per tablet Take 1 tablet by mouth once daily. (Patient not taking: Reported on 4/25/2025)      ergocalciferol (VITAMIN D2) 50,000 unit Cap Take 1 capsule (50,000 Units total) by mouth every 7 days. for 8 doses 4 capsule 1    EScitalopram oxalate (LEXAPRO) 10 MG tablet Take 1 tablet by mouth once daily. (Patient not taking: Reported on 4/25/2025)      HYDROcodone-acetaminophen (NORCO) 7.5-325 mg per tablet Take 1 tablet by mouth every 6 (six) hours as needed for Pain. (Patient not taking: Reported on 4/25/2025) 25 tablet 0    HYDROcodone-acetaminophen (NORCO) 7.5-325 mg per tablet Take 1 tablet by mouth  every 6 (six) hours as needed for Pain. (Patient not taking: Reported on 4/25/2025) 20 tablet 0    hydrOXYzine pamoate (VISTARIL) 25 MG Cap Take 25 mg by mouth 3 (three) times daily. (Patient not taking: Reported on 8/30/2024)      semaglutide (OZEMPIC) 0.25 mg or 0.5 mg (2 mg/3 mL) pen injector Inject 0.5 mg into the skin every 7 days. 3 mL 11    traZODone (DESYREL) 50 MG tablet Take 0.5 tablets (25 mg total) by mouth every evening. 15 tablet 11     No current facility-administered medications on file prior to visit.

## 2025-04-27 ENCOUNTER — APPOINTMENT (OUTPATIENT)
Dept: LAB | Facility: HOSPITAL | Age: 25
End: 2025-04-27
Payer: OTHER GOVERNMENT

## 2025-04-27 LAB
CRYPTOSP AG SPEC QL: NEGATIVE
G LAMBLIA AG STL QL IA: NEGATIVE

## 2025-04-28 LAB
C COLI+JEJ+UPSA DNA STL QL NAA+NON-PROBE: NEGATIVE
C DIFF GDH STL QL: NEGATIVE
C DIFF TOX A+B STL QL IA: NEGATIVE
CALPROTECTIN INTERP (OHS): NORMAL
CALPROTECTIN STOOL (OHS): 16 ΜG/G
E COLI SXT1 STL QL IA: NEGATIVE
E COLI SXT2 STL QL IA: NEGATIVE
ELASTASE, STOOL INTERPRETATION (OHS): NORMAL
H. PYLORI SURFACE ANTIGEN, INTERPRETATION (OHS): NEGATIVE
HELICOBACTER PYLORI SURFACE ANTIGEN (OHS): 0.14
PANCREATIC ELASTASE, FECAL (OHS): >800 ΜG/G
RV AG STL QL IA.RAPID: NEGATIVE
WBC #/AREA STL HPF: NORMAL /[HPF]

## 2025-04-29 LAB — BACTERIA STL CULT: NORMAL

## 2025-04-30 LAB
FAT STL QL: NORMAL
HADV DNA SPEC QL NAA+PROBE: NEGATIVE
NEUTRAL FAT STL QL: NORMAL
SPECIMEN SOURCE: NORMAL

## 2025-05-01 ENCOUNTER — RESULTS FOLLOW-UP (OUTPATIENT)
Dept: GASTROENTEROLOGY | Facility: CLINIC | Age: 25
End: 2025-05-01

## 2025-05-01 LAB — O+P STL MICRO: NORMAL

## 2025-05-05 ENCOUNTER — ANESTHESIA (OUTPATIENT)
Dept: ENDOSCOPY | Facility: HOSPITAL | Age: 25
End: 2025-05-05
Payer: OTHER GOVERNMENT

## 2025-05-05 ENCOUNTER — ANESTHESIA EVENT (OUTPATIENT)
Dept: ENDOSCOPY | Facility: HOSPITAL | Age: 25
End: 2025-05-05
Payer: OTHER GOVERNMENT

## 2025-05-05 ENCOUNTER — HOSPITAL ENCOUNTER (OUTPATIENT)
Facility: HOSPITAL | Age: 25
Discharge: HOME | End: 2025-05-05
Attending: INTERNAL MEDICINE | Admitting: INTERNAL MEDICINE
Payer: OTHER GOVERNMENT

## 2025-05-05 VITALS
WEIGHT: 286 LBS | OXYGEN SATURATION: 97 % | BODY MASS INDEX: 54 KG/M2 | DIASTOLIC BLOOD PRESSURE: 62 MMHG | RESPIRATION RATE: 18 BRPM | HEART RATE: 97 BPM | SYSTOLIC BLOOD PRESSURE: 123 MMHG | TEMPERATURE: 98 F | HEIGHT: 61 IN

## 2025-05-05 DIAGNOSIS — K92.1 MELENA: ICD-10-CM

## 2025-05-05 DIAGNOSIS — R10.9 ABDOMINAL PAIN: ICD-10-CM

## 2025-05-05 DIAGNOSIS — K21.9 GASTROESOPHAGEAL REFLUX DISEASE, UNSPECIFIED WHETHER ESOPHAGITIS PRESENT: ICD-10-CM

## 2025-05-05 DIAGNOSIS — R10.9 ABDOMINAL PAIN, UNSPECIFIED ABDOMINAL LOCATION: ICD-10-CM

## 2025-05-05 DIAGNOSIS — K21.9 GERD (GASTROESOPHAGEAL REFLUX DISEASE): ICD-10-CM

## 2025-05-05 LAB
B-HCG UR QL: NEGATIVE
CTP QC/QA: YES
POCT GLUCOSE: 150 MG/DL (ref 70–110)
POCT GLUCOSE: 170 MG/DL (ref 70–110)

## 2025-05-05 PROCEDURE — 43239 EGD BIOPSY SINGLE/MULTIPLE: CPT | Performed by: INTERNAL MEDICINE

## 2025-05-05 PROCEDURE — 63600175 PHARM REV CODE 636 W HCPCS: Performed by: NURSE ANESTHETIST, CERTIFIED REGISTERED

## 2025-05-05 PROCEDURE — 94640 AIRWAY INHALATION TREATMENT: CPT

## 2025-05-05 PROCEDURE — 37000008 HC ANESTHESIA 1ST 15 MINUTES: Performed by: INTERNAL MEDICINE

## 2025-05-05 PROCEDURE — 25000003 PHARM REV CODE 250: Performed by: NURSE ANESTHETIST, CERTIFIED REGISTERED

## 2025-05-05 PROCEDURE — 25000242 PHARM REV CODE 250 ALT 637 W/ HCPCS: Performed by: STUDENT IN AN ORGANIZED HEALTH CARE EDUCATION/TRAINING PROGRAM

## 2025-05-05 PROCEDURE — 27201012 HC FORCEPS, HOT/COLD, DISP: Performed by: INTERNAL MEDICINE

## 2025-05-05 PROCEDURE — 43239 EGD BIOPSY SINGLE/MULTIPLE: CPT | Mod: ,,, | Performed by: INTERNAL MEDICINE

## 2025-05-05 PROCEDURE — 82657 ENZYME CELL ACTIVITY: CPT | Performed by: INTERNAL MEDICINE

## 2025-05-05 PROCEDURE — 81025 URINE PREGNANCY TEST: CPT | Performed by: INTERNAL MEDICINE

## 2025-05-05 PROCEDURE — 88305 TISSUE EXAM BY PATHOLOGIST: CPT | Mod: TC,91 | Performed by: INTERNAL MEDICINE

## 2025-05-05 PROCEDURE — 94761 N-INVAS EAR/PLS OXIMETRY MLT: CPT

## 2025-05-05 PROCEDURE — 37000009 HC ANESTHESIA EA ADD 15 MINS: Performed by: INTERNAL MEDICINE

## 2025-05-05 RX ORDER — DEXMEDETOMIDINE HYDROCHLORIDE 100 UG/ML
INJECTION, SOLUTION INTRAVENOUS
Status: DISCONTINUED | OUTPATIENT
Start: 2025-05-05 | End: 2025-05-05

## 2025-05-05 RX ORDER — HALOPERIDOL LACTATE 5 MG/ML
0.5 INJECTION, SOLUTION INTRAMUSCULAR ONCE AS NEEDED
Status: DISCONTINUED | OUTPATIENT
Start: 2025-05-05 | End: 2025-05-05 | Stop reason: HOSPADM

## 2025-05-05 RX ORDER — IPRATROPIUM BROMIDE AND ALBUTEROL SULFATE 2.5; .5 MG/3ML; MG/3ML
3 SOLUTION RESPIRATORY (INHALATION) ONCE
Status: COMPLETED | OUTPATIENT
Start: 2025-05-05 | End: 2025-05-05

## 2025-05-05 RX ORDER — GLUCAGON 1 MG
1 KIT INJECTION
OUTPATIENT
Start: 2025-05-05

## 2025-05-05 RX ORDER — ONDANSETRON HYDROCHLORIDE 2 MG/ML
INJECTION, SOLUTION INTRAVENOUS
Status: DISCONTINUED | OUTPATIENT
Start: 2025-05-05 | End: 2025-05-05

## 2025-05-05 RX ORDER — DEXAMETHASONE SODIUM PHOSPHATE 4 MG/ML
INJECTION, SOLUTION INTRA-ARTICULAR; INTRALESIONAL; INTRAMUSCULAR; INTRAVENOUS; SOFT TISSUE
Status: DISCONTINUED | OUTPATIENT
Start: 2025-05-05 | End: 2025-05-05

## 2025-05-05 RX ORDER — FENTANYL CITRATE 50 UG/ML
INJECTION, SOLUTION INTRAMUSCULAR; INTRAVENOUS
Status: DISCONTINUED | OUTPATIENT
Start: 2025-05-05 | End: 2025-05-05

## 2025-05-05 RX ORDER — PROPOFOL 10 MG/ML
VIAL (ML) INTRAVENOUS
Status: DISCONTINUED | OUTPATIENT
Start: 2025-05-05 | End: 2025-05-05

## 2025-05-05 RX ORDER — ROCURONIUM BROMIDE 10 MG/ML
INJECTION, SOLUTION INTRAVENOUS
Status: DISCONTINUED | OUTPATIENT
Start: 2025-05-05 | End: 2025-05-05

## 2025-05-05 RX ORDER — SUCCINYLCHOLINE CHLORIDE 20 MG/ML
INJECTION INTRAMUSCULAR; INTRAVENOUS
Status: DISCONTINUED | OUTPATIENT
Start: 2025-05-05 | End: 2025-05-05

## 2025-05-05 RX ORDER — SODIUM CHLORIDE 9 MG/ML
INJECTION, SOLUTION INTRAVENOUS CONTINUOUS
Status: DISCONTINUED | OUTPATIENT
Start: 2025-05-05 | End: 2025-05-05 | Stop reason: HOSPADM

## 2025-05-05 RX ORDER — LIDOCAINE HYDROCHLORIDE 20 MG/ML
INJECTION, SOLUTION EPIDURAL; INFILTRATION; INTRACAUDAL; PERINEURAL
Status: DISCONTINUED | OUTPATIENT
Start: 2025-05-05 | End: 2025-05-05

## 2025-05-05 RX ADMIN — DEXAMETHASONE SODIUM PHOSPHATE 4 MG: 4 INJECTION, SOLUTION INTRAMUSCULAR; INTRAVENOUS at 03:05

## 2025-05-05 RX ADMIN — GLYCOPYRROLATE 0.2 MG: 0.2 INJECTION, SOLUTION INTRAMUSCULAR; INTRAVENOUS at 03:05

## 2025-05-05 RX ADMIN — SUCCINYLCHOLINE CHLORIDE 200 MG: 20 INJECTION, SOLUTION INTRAMUSCULAR; INTRAVENOUS; PARENTERAL at 03:05

## 2025-05-05 RX ADMIN — DEXMEDETOMIDINE 12 MCG: 200 INJECTION, SOLUTION INTRAVENOUS at 03:05

## 2025-05-05 RX ADMIN — LIDOCAINE HYDROCHLORIDE 80 MG: 20 INJECTION, SOLUTION EPIDURAL; INFILTRATION; INTRACAUDAL at 03:05

## 2025-05-05 RX ADMIN — ROCURONIUM BROMIDE 5 MG: 10 INJECTION, SOLUTION INTRAVENOUS at 03:05

## 2025-05-05 RX ADMIN — IPRATROPIUM BROMIDE AND ALBUTEROL SULFATE 3 ML: 2.5; .5 SOLUTION RESPIRATORY (INHALATION) at 04:05

## 2025-05-05 RX ADMIN — PROPOFOL 300 MG: 10 INJECTION, EMULSION INTRAVENOUS at 03:05

## 2025-05-05 RX ADMIN — SODIUM CHLORIDE: 0.9 INJECTION, SOLUTION INTRAVENOUS at 03:05

## 2025-05-05 RX ADMIN — ONDANSETRON 4 MG: 2 INJECTION INTRAMUSCULAR; INTRAVENOUS at 03:05

## 2025-05-05 RX ADMIN — FENTANYL CITRATE 50 MCG: 50 INJECTION INTRAMUSCULAR; INTRAVENOUS at 03:05

## 2025-05-05 NOTE — PROVATION PATIENT INSTRUCTIONS
Discharge Summary/Instructions after an Endoscopic Procedure  Patient Name: Anne Peterson  Patient MRN: [id number]  Patient YOB: 2000  Monday, May 5, 2025  Gucci Judge MD  Dear patient,  As a result of recent federal legislation (The Federal Cures Act), you may   receive lab or pathology results from your procedure in your [facility]   account before your physician is able to contact you. Your physician or   their representative will relay the results to you with their   recommendations at their soonest availability.  Thank you,  RESTRICTIONS:  During your procedure today, you received medications for sedation.  These   medications may affect your judgment, balance and coordination.  Therefore,   for 24 hours, you have the following restrictions:   - DO NOT drive a car, operate machinery, make legal/financial decisions,   sign important papers or drink alcohol.    ACTIVITY:  Today: no heavy lifting, straining or running due to procedural   sedation/anesthesia.  The following day: return to full activity including work.  DIET:  Eat and drink normally unless instructed otherwise.     TREATMENT FOR COMMON SIDE EFFECTS:  - Mild abdominal pain, nausea, belching, bloating or excessive gas:  rest,   eat lightly and use a heating pad.  - Sore Throat: treat with throat lozenges and/or gargle with warm salt   water.  - Because air was used during the procedure, expelling large amounts of air   from your rectum or belching is normal.  - If a bowel prep was taken, you may not have a bowel movement for 1-3 days.    This is normal.  SYMPTOMS TO WATCH FOR AND REPORT TO YOUR PHYSICIAN:  1. Abdominal pain or bloating, other than gas cramps.  2. Chest pain.  3. Back pain.  4. Signs of infection such as: chills or fever occurring within 24 hours   after the procedure.  5. Rectal bleeding, which would show as bright red, maroon, or black stools.   (A tablespoon of blood from the rectum is not serious, especially if    hemorrhoids are present.)  6. Vomiting.  7. Weakness or dizziness.  GO DIRECTLY TO THE NEAREST EMERGENCY ROOM IF YOU HAVE ANY OF THE FOLLOWING:      Difficulty breathing              Chills and/or fever over 101 F   Persistent vomiting and/or vomiting blood   Severe abdominal pain   Severe chest pain   Black, tarry stools   Bleeding- more than one tablespoon   Any other symptom or condition that you feel may need urgent attention  Your doctor recommends these additional instructions:  If any biopsies were taken, your doctors clinic will contact you in 1 to 2   weeks with any results.  - Discharge patient to home.   - Follow an antireflux regimen indefinitely.   - Continue present medication - Omeprazole 40mg once daily - best taken   45-60 minutes before your first protein meal of the day - Breakfast.   - Telephone nurse practitioner for pathology results in 2 weeks.   - Return to nurse practitioner at the next available appointment.   - For the next 2 weeks only - Consider avoiding all non-steroidal   anti-inflammatory drugs (aspirin, ibuprofen, naproxen, etc.), unless needed   for cardiovascular protection.  Recommend you discuss with your prescribing   doctor (of your aspirin) to see if cardiovascular benefits of your aspirin   outweigh the risks of GI bleeding. O.K. to take aspirin if aspirin is   needed for cardiovascular protection.  - Repeat upper endoscopy in 3 years for surveillance based on pathology   results.   - The findings and recommendations were discussed with the patient.  For questions, problems or results please call your physician - Gucci Judge MD at Work:  [phone].  [facility], EMERGENCY ROOM PHONE NUMBER: [phone]  IF A COMPLICATION OR EMERGENCY SITUATION ARISES AND YOU ARE UNABLE TO REACH   YOUR PHYSICIAN - GO DIRECTLY TO THE EMERGENCY ROOM.  Gucci Judge MD  5/5/2025 3:59:09 PM  This report has been verified and signed electronically.  Dear patient,  As a  result of recent federal legislation (The Federal Cures Act), you may   receive lab or pathology results from your procedure in your [facility]   account before your physician is able to contact you. Your physician or   their representative will relay the results to you with their   recommendations at their soonest availability.  Thank you,  PROVATION

## 2025-05-05 NOTE — ANESTHESIA PREPROCEDURE EVALUATION
"    Pre-operative evaluation for Procedure(s) (LRB):  EGD (ESOPHAGOGASTRODUODENOSCOPY) (N/A)    Anne Peterson is a 24 y.o. female w/ morbid obesity (BMI 54), DM2 (taking Ozempic), HTN, PCOS/endometriosis, GERD and recurrent abdominal  pain who presents for above procedure.       Prev airway: none on record      2D Echo: none on record      EKG: none on record        Problem List[1]    Review of patient's allergies indicates:   Allergen Reactions    Cephalosporins Anaphylaxis, Hives, Itching, Shortness Of Breath, Rash and Swelling    Sulfa (sulfonamide antibiotics) Anaphylaxis, Hives, Itching, Rash and Swelling       Past Surgical History:   Procedure Laterality Date    AXILLARY HIDRADENITIS EXCISION Bilateral 11/13/2024    EXCISION OF HIDRADENITIS Bilateral 11/13/2024    Procedure: EXCISION, HIDRADENITIS, bilateral axilla, sacrum;  Surgeon: Ben Schulz MD;  Location: [facility];  Service: General;  Laterality: Bilateral;    SURGICAL REMOVAL OF PILONIDAL CYST  11/13/2024    WISDOM TOOTH EXTRACTION           Vital Signs:         CBC: No results for input(s): "WBC", "RBC", "HGB", "HCT", "PLT", "MCV", "MCH", "MCHC" in the last 72 hours.    CMP: No results for input(s): "NA", "K", "CL", "CO2", "BUN", "CREATININE", "GLU", "MG", "PHOS", "CALCIUM", "ALBUMIN", "PROT", "ALKPHOS", "ALT", "AST", "BILITOT" in the last 72 hours.    INR  No results for input(s): "PT", "INR", "PROTIME", "APTT" in the last 72 hours.        Pre-op Assessment    I have reviewed the Patient Summary Reports.     I have reviewed the Nursing Notes. I have reviewed the NPO Status.   I have reviewed the Medications.     Review of Systems  Anesthesia Hx:  No problems with previous Anesthesia             Denies Family Hx of Anesthesia complications.    Denies Personal Hx of Anesthesia complications.                    Hematology/Oncology:    Oncology Normal                                   Cardiovascular:     Denies Pacemaker. Hypertension  Denies " Valvular problems/Murmurs.   Denies CABG/stent.           ECG has been reviewed.                            Pulmonary:     Denies Asthma.    Sleep Apnea                Renal/:  Renal/ Normal                 Hepatic/GI:     GERD    Taking GLP-1 Agonists            Neurological:  Neurology Normal   Denies CVA.    Denies Seizures.                                Endocrine:  Diabetes         Morbid Obesity / BMI > 40  Psych:  Psychiatric History                  Physical Exam  General: Alert and Oriented    Airway:  Mallampati: II   Mouth Opening: Normal  TM Distance: Normal  Tongue: Normal    Dental:  Intact    Chest/Lungs:  Clear to auscultation, Normal Respiratory Rate    Heart:  Rate: Normal  Rhythm: Regular Rhythm        Anesthesia Plan  Type of Anesthesia, risks & benefits discussed:    Anesthesia Type: Gen Natural Airway  Intra-op Monitoring Plan: Standard ASA Monitors  Post Op Pain Control Plan: IV/PO Opioids PRN and multimodal analgesia  Induction:  IV  Informed Consent: Informed consent signed with the Patient and all parties understand the risks and agree with anesthesia plan.  All questions answered.   ASA Score: 3  Day of Surgery Review of History & Physical: H&P Update referred to the surgeon/provider.    Ready For Surgery From Anesthesia Perspective.     .           [1]   Patient Active Problem List  Diagnosis    Symptomatic cholelithiasis    Bipolar affective disorder

## 2025-05-05 NOTE — H&P
[facility] - Endoscopy  History & Physical    Subjective:      Chief Complaint/Reason for Admission:     Direct access EGD for GERD and generalized abdominal pain and loose stool and occasional dark stools and take some NSAID's for musculoskeletal pain and just started taking Omeprazole 40mg once daily.     Anne Peterson is a 24 y.o. female.    Past Medical History:   Diagnosis Date    Diabetes mellitus     Hidradenitis     Symptomatic cholelithiasis 04/09/2024     Past Surgical History:   Procedure Laterality Date    AXILLARY HIDRADENITIS EXCISION Bilateral 11/13/2024    EXCISION OF HIDRADENITIS Bilateral 11/13/2024    Procedure: EXCISION, HIDRADENITIS, bilateral axilla, sacrum;  Surgeon: Ben Schulz MD;  Location: [facility];  Service: General;  Laterality: Bilateral;    SURGICAL REMOVAL OF PILONIDAL CYST  11/13/2024    WISDOM TOOTH EXTRACTION       Social History[1]    PTA Medications   Medication Sig    diclofenac sodium (SOLARAZE) 3 % gel Apply 1 Application topically 2 (two) times daily. (Patient not taking: Reported on 8/30/2024)    drospirenone-ethinyl estradioL (JOELLEN) 3-0.02 mg per tablet Take 1 tablet by mouth once daily. (Patient not taking: Reported on 4/25/2025)    ergocalciferol (VITAMIN D2) 50,000 unit Cap Take 1 capsule (50,000 Units total) by mouth every 7 days. for 8 doses    EScitalopram oxalate (LEXAPRO) 10 MG tablet Take 1 tablet by mouth once daily. (Patient not taking: Reported on 4/25/2025)    HYDROcodone-acetaminophen (NORCO) 7.5-325 mg per tablet Take 1 tablet by mouth every 6 (six) hours as needed for Pain. (Patient not taking: Reported on 4/25/2025)    HYDROcodone-acetaminophen (NORCO) 7.5-325 mg per tablet Take 1 tablet by mouth every 6 (six) hours as needed for Pain. (Patient not taking: Reported on 4/25/2025)    hydrOXYzine pamoate (VISTARIL) 25 MG Cap Take 25 mg by mouth 3 (three) times daily. (Patient not taking: Reported on 8/30/2024)    omeprazole (PRILOSEC) 40 MG capsule  Take 1 capsule (40 mg total) by mouth once daily.    semaglutide (OZEMPIC) 0.25 mg or 0.5 mg (2 mg/3 mL) pen injector Inject 0.5 mg into the skin every 7 days.    traZODone (DESYREL) 50 MG tablet Take 0.5 tablets (25 mg total) by mouth every evening.     Review of patient's allergies indicates:   Allergen Reactions    Cephalosporins Anaphylaxis, Hives, Itching, Shortness Of Breath, Rash and Swelling    Sulfa (sulfonamide antibiotics) Anaphylaxis, Hives, Itching, Rash and Swelling        Review of Systems   Constitutional:  Negative for fever.       Objective:      Vital Signs (Most Recent)  Temp: 98.2 °F (36.8 °C) (05/05/25 1506)  Pulse: 100 (05/05/25 1506)  Resp: 17 (05/05/25 1506)  BP: 126/77 (05/05/25 1506)  SpO2: 97 % (05/05/25 1506)    Vital Signs Range (Last 24H):  Temp:  [98.2 °F (36.8 °C)]   Pulse:  [100]   Resp:  [17]   BP: (126)/(77)   SpO2:  [97 %]     Physical Exam  Cardiovascular:      Rate and Rhythm: Normal rate.   Pulmonary:      Effort: Pulmonary effort is normal.   Neurological:      Mental Status: She is alert and oriented to person, place, and time.             Assessment:        direct access EGD for GERD and generalized abdominal pain and loose stool and occasional dark stools and take some NSAID's for musculoskeletal pain and just started taking Omeprazole 40mg once daily.    Plan:    direct access EGD for GERD and generalized abdominal pain and loose stool and occasional dark stools and take some NSAID's for musculoskeletal pain and just started taking Omeprazole 40mg once daily.         [1]   Social History  Tobacco Use    Smoking status: Every Day     Current packs/day: 0.50     Types: Cigarettes    Smokeless tobacco: Current   Substance Use Topics    Alcohol use: Never    Drug use: Never

## 2025-05-05 NOTE — ANESTHESIA PROCEDURE NOTES
Intubation    Date/Time: 5/5/2025 3:21 PM    Performed by: Iker Arevalo CRNA  Authorized by: Iva Still MD    Intubation:     Induction:  Rapid sequence induction    Intubated:  Postinduction    Mask Ventilation:  Not attempted    Attempted By:  CRNA    Method of Intubation:  Video laryngoscopy    Blade:  Meza 3    Laryngeal View Grade: Grade I - full view of cords      Difficult Airway Encountered?: No      Complications:  None    Airway Device:  Oral endotracheal tube    Airway Device Size:  7.5    Style/Cuff Inflation:  Cuffed    Inflation Amount (mL):  5    Tube secured:  22    Secured at:  The lips    Placement Verified By:  Capnometry and Revisualization with laryngoscopy    Findings Post-Intubation:  BS equal bilateral and atraumatic/condition of teeth unchanged

## 2025-05-05 NOTE — TRANSFER OF CARE
"Anesthesia Transfer of Care Note    Patient: Anne Peterson    Procedure(s) Performed: Procedure(s) (LRB):  EGD (ESOPHAGOGASTRODUODENOSCOPY) (N/A)    Patient location: PACU    Anesthesia Type: general    Transport from OR: Transported from OR on 100% O2 by closed face mask with adequate spontaneous ventilation    Post pain: adequate analgesia    Post assessment: no apparent anesthetic complications and tolerated procedure well    Post vital signs: stable    Level of consciousness: awake, alert and oriented    Nausea/Vomiting: no nausea/vomiting    Complications: none    Transfer of care protocol was followed      Last vitals: Visit Vitals  /77 (BP Location: Left arm, Patient Position: Lying)   Pulse 100   Temp 36.8 °C (98.2 °F) (Temporal)   Resp 17   Ht 5' 1" (1.549 m)   Wt 129.7 kg (286 lb)   SpO2 97%   Breastfeeding No   BMI 54.04 kg/m²     "

## 2025-05-06 NOTE — ANESTHESIA POSTPROCEDURE EVALUATION
Anesthesia Post Evaluation    Patient: Anne Peterson    Procedure(s) Performed: Procedure(s) (LRB):  EGD (ESOPHAGOGASTRODUODENOSCOPY) (N/A)    Final Anesthesia Type: general      Patient location during evaluation: PACU  Patient participation: Yes- Able to Participate  Level of consciousness: awake  Post-procedure vital signs: reviewed and stable  Pain management: adequate  Airway patency: patent    PONV status at discharge: No PONV  Anesthetic complications: no      Cardiovascular status: blood pressure returned to baseline  Respiratory status: unassisted, spontaneous ventilation and room air                Vitals Value Taken Time   /62 05/05/25 16:47   Temp  05/06/25 09:45   Pulse 101 05/05/25 16:49   Resp 26 05/05/25 16:48   SpO2 94 % 05/05/25 16:49   Vitals shown include unfiled device data.      No case tracking events are documented in the log.      Pain/Kamille Score: Kamille Score: 10 (5/5/2025  4:15 PM)

## 2025-05-07 LAB
ACID A-GLUCOSIDASE TSMI-CCNT: >205.1 NMOL/MIN/MG PROT
DISACCHARIDASES TSMI-IMP: ABNORMAL
GLUCAN 1,4-ALPHA-GLUCOSIDASE TSMI-CCNT: 35.6 NMOL/MIN/MG PROT
LACTASE TSMI-CCNT: 8.6 NMOL/MIN/MG PROT
PALATINASE TSMI-CCNT: 22.1 NMOL/MIN/MG PROT
PROVIDER SIGNING NAME: ABNORMAL
SUCRASE TSMI-CCNT: >52.9 NMOL/MIN/MG PROT

## 2025-05-08 LAB
ESTROGEN SERPL-MCNC: NORMAL PG/ML
INSULIN SERPL-ACNC: NORMAL U[IU]/ML
LAB AP CLINICAL INFORMATION: NORMAL
LAB AP GROSS DESCRIPTION: NORMAL
LAB AP PERFORMING LOCATION(S): NORMAL
LAB AP REPORT FOOTNOTES: NORMAL
T3RU NFR SERPL: NORMAL %

## 2025-05-13 ENCOUNTER — OFFICE VISIT (OUTPATIENT)
Dept: SURGERY | Facility: CLINIC | Age: 25
End: 2025-05-13
Payer: OTHER GOVERNMENT

## 2025-05-13 DIAGNOSIS — R10.9 ABDOMINAL PAIN, UNSPECIFIED ABDOMINAL LOCATION: ICD-10-CM

## 2025-05-13 DIAGNOSIS — K80.20 SYMPTOMATIC CHOLELITHIASIS: Primary | ICD-10-CM

## 2025-05-13 NOTE — PROGRESS NOTES
The patient location is: Home  The chief complaint leading to consultation is: gallstones    Visit type: audiovisual      29 minutes of total time spent on the encounter, which includes face to face time and non-face to face time preparing to see the patient (eg, review of tests), Obtaining and/or reviewing separately obtained history, Documenting clinical information in the electronic or other health record, Independently interpreting results (not separately reported) and communicating results to the patient/family/caregiver, or Care coordination (not separately reported).         Each patient to whom he or she provides medical services by telemedicine is:  (1) informed of the relationship between the physician and patient and the respective role of any other health care provider with respect to management of the patient; and (2) notified that he or she may decline to receive medical services by telemedicine and may withdraw from such care at any time.    Notes:     History & Physical    SUBJECTIVE:     History of Present Illness:  Patient is a 24 y.o. female presents with morbid obesity (last bmi in record is 54), pcos, dm2, hydradenitis, current every day smoker and symptomatic cholelithiasis.  She is having a bunch of issues.  She has knee injuries and ankle issues.  This has worsened and now she has pelvic and back pain.  She says an epidural she got during pregnancy gave her trouble on her left side.  She was told she had a hernia and pcos.  She has chronic pain so bad she can only stand up sometimes for a few minutes, can't stretch and can barely do the dishes.  She takes ibuprofen and baths without much relief.  She generally has 8-10/10 pain.  She frequently goes to the hospital for pain control.  She can't run and can only walk short distances.  She has a hard time bending.  Her main complaint is these pains.    She is also having some abdominal pain.  She says the stones have grown.  She says she has  weird stomach pains.  Most of the time she has a stabbing/punching pain and other times a needle/dull pain and other times burning.  She says the pain is always there and is all over her stomach, especially the mid abdomin and bilaterally lower abdomen.  She says activity or lifting increases the pain.  Any activity more than 10 minutes makes her lay down for the rest of the day due to pain.  She says pain medications help such as opioids, tramadol.  Nothing else has helped.  Tylenol and nsaids have not helped but they do help with headaches.  She has gone to the hospital for this pain.  She has some nausea, vomiting and diarrhea with this.    No chief complaint on file.      Review of patient's allergies indicates:   Allergen Reactions    Cephalosporins Anaphylaxis, Hives, Itching, Shortness Of Breath, Rash and Swelling    Sulfa (sulfonamide antibiotics) Anaphylaxis, Hives, Itching, Rash and Swelling       Current Medications[1]    Past Medical History:   Diagnosis Date    Diabetes mellitus     Hidradenitis     Symptomatic cholelithiasis 04/09/2024     Past Surgical History:   Procedure Laterality Date    AXILLARY HIDRADENITIS EXCISION Bilateral 11/13/2024    ESOPHAGOGASTRODUODENOSCOPY N/A 5/5/2025    Procedure: EGD (ESOPHAGOGASTRODUODENOSCOPY);  Surgeon: Gucci Judge MD;  Location: [address]);  Service: Endoscopy;  Laterality: N/A;  veronica/tianna hoang/portal/diabetic  4/30/25: precall complete - not taking ozempic - MP    EXCISION OF HIDRADENITIS Bilateral 11/13/2024    Procedure: EXCISION, HIDRADENITIS, bilateral axilla, sacrum;  Surgeon: Ben Schulz MD;  Location: [facility];  Service: General;  Laterality: Bilateral;    SURGICAL REMOVAL OF PILONIDAL CYST  11/13/2024    WISDOM TOOTH EXTRACTION       No family history on file.  Social History[2]     Review of Systems:  Review of Systems    OBJECTIVE:     Vital Signs (Most Recent)              Physical Exam:  Physical Exam    Laboratory  CBC:  Reviewed  CMP: Reviewed  Elevated glc c/w dm, elevated alt of unknown etiology    Diagnostic Results:  U/s reviewed, films viewed, large gallstones, no findings of acute cindi, fatty liver  Hida with EF reviewed, normal  Ct reviewed, films viewed, gallstone not seen, no acute findings, fatty liver  Egd reviewed, 1cm hh, gastritis    ASSESSMENT/PLAN:     Chronic pain.  Abdominal pain but not typical for gallstone pain although she has large gallstones.  Possible IBS, dyspepsia or other GI etiologies of pain.    PLAN:       I suggest she get her pain under control first.  I suggest (robotic) cindi.  After pain control prior to cindi she will need a 2 week bariatric liquid diet and pcp clearance.  She is being sent to pain management (May 23).  Follow up GI.  Follow up with me 2 months.                [1]   Current Outpatient Medications   Medication Sig Dispense Refill    diclofenac sodium (SOLARAZE) 3 % gel Apply 1 Application topically 2 (two) times daily. (Patient not taking: Reported on 8/30/2024)      drospirenone-ethinyl estradioL (JOELLEN) 3-0.02 mg per tablet Take 1 tablet by mouth once daily. (Patient not taking: Reported on 4/25/2025)      ergocalciferol (VITAMIN D2) 50,000 unit Cap Take 1 capsule (50,000 Units total) by mouth every 7 days. for 8 doses 4 capsule 1    EScitalopram oxalate (LEXAPRO) 10 MG tablet Take 1 tablet by mouth once daily. (Patient not taking: Reported on 4/25/2025)      HYDROcodone-acetaminophen (NORCO) 7.5-325 mg per tablet Take 1 tablet by mouth every 6 (six) hours as needed for Pain. (Patient not taking: Reported on 4/25/2025) 25 tablet 0    HYDROcodone-acetaminophen (NORCO) 7.5-325 mg per tablet Take 1 tablet by mouth every 6 (six) hours as needed for Pain. (Patient not taking: Reported on 4/25/2025) 20 tablet 0    hydrOXYzine pamoate (VISTARIL) 25 MG Cap Take 25 mg by mouth 3 (three) times daily. (Patient not taking: Reported on 8/30/2024)      omeprazole (PRILOSEC) 40 MG capsule Take  1 capsule (40 mg total) by mouth once daily. 90 capsule 3    semaglutide (OZEMPIC) 0.25 mg or 0.5 mg (2 mg/3 mL) pen injector Inject 0.5 mg into the skin every 7 days. 3 mL 11    traZODone (DESYREL) 50 MG tablet Take 0.5 tablets (25 mg total) by mouth every evening. 15 tablet 11     No current facility-administered medications for this visit.   [2]   Social History  Tobacco Use    Smoking status: Every Day     Current packs/day: 0.50     Types: Cigarettes    Smokeless tobacco: Current   Substance Use Topics    Alcohol use: Never    Drug use: Never

## 2025-05-13 NOTE — LETTER
May 13, 2025        Harman Rodríguez MD  [address] [facility] [address]             [facility]  [address]  [facility] [address]  Phone: [phone]   Patient: Anne Peterson   MR Number: [id number]   YOB: 2000   Date of Visit: 5/13/2025       Dear Dr. Rodríguez:    Thank you for referring Anne Peterson to me for evaluation. Attached you will find relevant portions of my assessment and plan of care.    If you have questions, please do not hesitate to call me. I look forward to following Anne Peterson along with you.    Sincerely,      Joesph Mclain MD            CC  No Recipients    Enclosure

## 2025-05-23 ENCOUNTER — OFFICE VISIT (OUTPATIENT)
Dept: PAIN MEDICINE | Facility: CLINIC | Age: 25
End: 2025-05-23
Payer: OTHER GOVERNMENT

## 2025-05-23 ENCOUNTER — HOSPITAL ENCOUNTER (OUTPATIENT)
Dept: RADIOLOGY | Facility: HOSPITAL | Age: 25
Discharge: HOME | End: 2025-05-23
Attending: EMERGENCY MEDICINE
Payer: OTHER GOVERNMENT

## 2025-05-23 VITALS
SYSTOLIC BLOOD PRESSURE: 136 MMHG | DIASTOLIC BLOOD PRESSURE: 83 MMHG | HEIGHT: 61 IN | WEIGHT: 284.19 LBS | HEART RATE: 108 BPM | BODY MASS INDEX: 53.66 KG/M2

## 2025-05-23 DIAGNOSIS — M54.9 DORSALGIA, UNSPECIFIED: ICD-10-CM

## 2025-05-23 DIAGNOSIS — M54.9 DORSALGIA: Primary | ICD-10-CM

## 2025-05-23 DIAGNOSIS — M54.9 DORSALGIA: ICD-10-CM

## 2025-05-23 DIAGNOSIS — R10.10 PAIN OF UPPER ABDOMEN: ICD-10-CM

## 2025-05-23 PROCEDURE — 72114 X-RAY EXAM L-S SPINE BENDING: CPT | Mod: TC

## 2025-05-23 PROCEDURE — 99999 PR PBB SHADOW E&M-EST. PATIENT-LVL III: CPT | Mod: PBBFAC,,, | Performed by: EMERGENCY MEDICINE

## 2025-05-23 PROCEDURE — 72114 X-RAY EXAM L-S SPINE BENDING: CPT | Mod: 26,,, | Performed by: RADIOLOGY

## 2025-05-23 PROCEDURE — 99213 OFFICE O/P EST LOW 20 MIN: CPT | Mod: PBBFAC,25 | Performed by: EMERGENCY MEDICINE

## 2025-05-23 PROCEDURE — 99204 OFFICE O/P NEW MOD 45 MIN: CPT | Mod: S$PBB,,, | Performed by: EMERGENCY MEDICINE

## 2025-05-23 NOTE — PROGRESS NOTES
"Interventional Pain Management - New Patient Visit    Chief Complaint   Patient presents with    Low-back Pain    Hip Pain    Abdominal Pain        Original HPI 05/23/2025: Anne Peterson  presents to the clinic for the evaluation of the above pain. The pain started 4 years ago. Original Pain Description: The pain is located in the lower back and is axial. The pain is described as bricks,strong aching . Exacerbating factors: Standing and Walking. Mitigating factors heat, ice, and soaking, stretching. Symptoms interfere with daily activity. The patient feels like symptoms have been worsening. Patient reports significant motor weakness and loss of sensations. Denies perineal paresthesias, bowel/bladder dysfunction. Patient reports that she was sent to PT for a worker's comp knee issue. While at PT she started having lower back pain that became worse after a fall during PT. She states that the pain progressed and became worse after. Patient reports that she has "nerve damage" on her right side of body from "shoulder all the way down" from an epidural for child birth.     Patient reports that she has "whole entire" abdominal pain that varies in location but in general is the entire abdomen since she was 8 years old. Patient has been under the care of general surgery and gastroenterology. Patient has cholelithiasis which the gen surgeon believes causes her pain, but not all of it.     PAIN SCORES:  Best: Pain is 5  Current: Pain is 8  Worst: Pain is 10    6 weeks of Conservative therapy:  PT: Participating  Chiro:  HEP: Unable due to pain    Treatments / Medications:   Tylenol Prn  Ibuprofen PRN    Antiplatelets/Anticoagulants/Immunosuppressants:  NA    Interventional Pain Procedures:   NA    IMAGING:    NA    Past Surgical History:   Procedure Laterality Date    AXILLARY HIDRADENITIS EXCISION Bilateral 11/13/2024    ESOPHAGOGASTRODUODENOSCOPY N/A 5/5/2025    Procedure: EGD (ESOPHAGOGASTRODUODENOSCOPY);  Surgeon: " Gucci Judge MD;  [facility]  Service: Endoscopy;  Laterality: N/A;  veronica/tianna hoang/portal/diabetic  4/30/25: precall complete - not taking ozempic - MP    EXCISION OF HIDRADENITIS Bilateral 11/13/2024    Procedure: EXCISION, HIDRADENITIS, bilateral axilla, sacrum;  Surgeon: Ben Schulz MD;  Location: [facility] OR;  Service: General;  Laterality: Bilateral;    SURGICAL REMOVAL OF PILONIDAL CYST  11/13/2024    WISDOM TOOTH EXTRACTION         Social History     Socioeconomic History    Marital status:    Tobacco Use    Smoking status: Every Day     Current packs/day: 0.50     Types: Cigarettes    Smokeless tobacco: Current   Substance and Sexual Activity    Alcohol use: Never    Drug use: Never    Sexual activity: Yes     Partners: Male     Social Drivers of Health     Financial Resource Strain: Patient Declined (4/24/2025)    Overall Financial Resource Strain (CARDIA)     Difficulty of Paying Living Expenses: Patient declined   Food Insecurity: Food Insecurity Present (4/24/2025)    Hunger Vital Sign     Worried About Running Out of Food in the Last Year: Sometimes true     Ran Out of Food in the Last Year: Sometimes true   Transportation Needs: Patient Declined (4/24/2025)    PRAPARE - Transportation     Lack of Transportation (Medical): Patient declined     Lack of Transportation (Non-Medical): Patient declined   Physical Activity: Patient Declined (4/24/2025)    Exercise Vital Sign     Days of Exercise per Week: Patient declined     Minutes of Exercise per Session: Patient declined   Stress: Patient Declined (4/24/2025)    [employer] - Occupational Stress Questionnaire     Feeling of Stress : Patient declined   Housing Stability: Low Risk  (4/24/2025)    Housing Stability Vital Sign     Unable to Pay for Housing in the Last Year: No     Number of Times Moved in the Last Year: 0     Homeless in the Last Year: No       Medications/Allergies: See  "med card    ROS:  GENERAL: No fever. No chills. No fatigue. Denies weight loss. Denies weight gain.  Back / musculoskeletal / neuro : See HPI    VITALS:   Vitals:    05/23/25 1434   BP: 136/83   Pulse: 108   Weight: 128.9 kg (284 lb 2.8 oz)   Height: 5' 1" (1.549 m)   PainSc:   8   PainLoc: Back     Body mass index is 53.69 kg/m².      5/23/2025     2:34 PM   Last 3 PDI Scores   Pain Disability Index (PDI) 56       PHYSICAL EXAM:   GENERAL: Well appearing, in no acute distress, alert and oriented x3.  PSYCH:  Mood and affect appropriate.  SKIN: Skin color, texture, turgor normal, no rashes or lesions.  HEENT:  Normocephalic, atraumatic. Cranial nerves grossly intact.  NECK: No pain to palpation over the cervical paraspinous muscles. No pain to palpation over facets. No pain with neck flexion, extension, or lateral flexion.   PULM: No evidence of respiratory difficulty, symmetric chest rise.  GI:  Non-distended  BACK: Normal range of motion. No pain to palpation over the spinous processes. Pain with axial loading. Generalized TTP to light touch.  There is no pain with palpation over the sacroiliac joints bilaterally.   EXTREMITIES: No deformities, edema, or skin discoloration.   MUSCULOSKELETAL: Shoulder, hip, and knee provocative maneuvers are negative. No atrophy is noted.  NEURO: Sensation is equal and appropriate bilaterally. Bilateral upper and lower extremity strength is normal and symmetric. Bilateral upper and lower extremity coordination and muscle stretch reflexes are physiologic and symmetric. Plantar response are downgoing. Straight leg raising in the supine position is negative to radicular pain.   GAIT: normal.      LABS:    Lab Results   Component Value Date    HGBA1C 7.8 (H) 04/14/2025       Lab Results   Component Value Date    CREATININE 0.7 04/14/2025         ASSESSMENT: 24 y.o. year old female with pain, consistent with:    Encounter Diagnoses   Name Primary?    Pain of upper abdomen     Dorsalgia " Yes    Dorsalgia, unspecified        DISCUSSION: Anne Peterson is a patient with PMHx of Bipolar disorder, PCOS, DM and symptomatic cholelithiasis who comes to us with chronic generalized abdominal pain and chronic lower back pain that hurts with any activity.      PLAN:  - I have stressed the importance of physical activity and a home exercise plan to help with pain and improve health.  - Patient can continue with medications for now since they are providing benefits, using them appropriately, and without side effects.  - Counseled patient regarding the importance of activity modification and constant sleeping habits.  - Therapy: Referral to Physical therapy for Lumbar stabilization, core strengthening, and a home exercise program after we obtain our imaging  - Imaging: Reviewed available imaging with patient and answered any questions they had regarding study.Order Flexion-Extension X-rays of the Lumbar spine to rule out any instability. and Order MRI of the Lumbar Spine to help evaluate possible source of pain.  - The patient's pathophysiology was explained in detail with reference to x-rays, models, other visual aids as appropriate.   - Follow up visit: return to clinic in     I would like to thank Burke Forrest DO for the opportunity to assist in the care of this patient. We had a very nice visit and I look forward to continuing their care. Please let me know if I can be of further assistance.     Amado Dennison MD  05/23/2025

## 2025-05-28 ENCOUNTER — NURSE TRIAGE (OUTPATIENT)
Dept: ADMINISTRATIVE | Facility: CLINIC | Age: 25
End: 2025-05-28
Payer: OTHER GOVERNMENT

## 2025-05-28 ENCOUNTER — TELEPHONE (OUTPATIENT)
Dept: INTERNAL MEDICINE | Facility: CLINIC | Age: 25
End: 2025-05-28
Payer: OTHER GOVERNMENT

## 2025-05-28 ENCOUNTER — HOSPITAL ENCOUNTER (EMERGENCY)
Facility: HOSPITAL | Age: 25
Discharge: HOME | End: 2025-05-28
Attending: EMERGENCY MEDICINE
Payer: OTHER GOVERNMENT

## 2025-05-28 VITALS
HEART RATE: 108 BPM | HEIGHT: 61 IN | BODY MASS INDEX: 53.62 KG/M2 | DIASTOLIC BLOOD PRESSURE: 88 MMHG | WEIGHT: 284 LBS | TEMPERATURE: 98 F | OXYGEN SATURATION: 96 % | SYSTOLIC BLOOD PRESSURE: 150 MMHG | RESPIRATION RATE: 13 BRPM

## 2025-05-28 DIAGNOSIS — R10.9 ABDOMINAL PAIN: Primary | ICD-10-CM

## 2025-05-28 LAB
ABSOLUTE EOSINOPHIL (OHS): 0.12 K/UL
ABSOLUTE MONOCYTE (OHS): 0.79 K/UL (ref 0.3–1)
ABSOLUTE NEUTROPHIL COUNT (OHS): 8.93 K/UL (ref 1.8–7.7)
ALBUMIN SERPL BCP-MCNC: 3.8 G/DL (ref 3.5–5.2)
ALP SERPL-CCNC: 121 UNIT/L (ref 40–150)
ALT SERPL W/O P-5'-P-CCNC: 44 UNIT/L (ref 10–44)
AMPHET UR QL SCN: NEGATIVE
ANION GAP (OHS): 13 MMOL/L (ref 8–16)
AST SERPL-CCNC: 26 UNIT/L (ref 11–45)
B-HCG UR QL: NEGATIVE
BACTERIA #/AREA URNS AUTO: NORMAL /HPF
BARBITURATE SCN PRESENT UR: NEGATIVE
BASOPHILS # BLD AUTO: 0.05 K/UL
BASOPHILS NFR BLD AUTO: 0.4 %
BENZODIAZ UR QL SCN: NEGATIVE
BILIRUB SERPL-MCNC: 0.3 MG/DL (ref 0.1–1)
BILIRUB UR QL STRIP.AUTO: NEGATIVE
BUN SERPL-MCNC: 11 MG/DL (ref 6–20)
CALCIUM SERPL-MCNC: 8.7 MG/DL (ref 8.7–10.5)
CANNABINOIDS UR QL SCN: NEGATIVE
CHLORIDE SERPL-SCNC: 107 MMOL/L (ref 95–110)
CLARITY UR: CLEAR
CO2 SERPL-SCNC: 18 MMOL/L (ref 23–29)
COCAINE UR QL SCN: NEGATIVE
COLOR UR AUTO: YELLOW
CREAT SERPL-MCNC: 0.6 MG/DL (ref 0.5–1.4)
CREAT UR-MCNC: 116 MG/DL (ref 15–325)
CTP QC/QA: YES
ERYTHROCYTE [DISTWIDTH] IN BLOOD BY AUTOMATED COUNT: 12.5 % (ref 11.5–14.5)
GFR SERPLBLD CREATININE-BSD FMLA CKD-EPI: >60 ML/MIN/1.73/M2
GLUCOSE SERPL-MCNC: 244 MG/DL (ref 70–110)
GLUCOSE UR QL STRIP: ABNORMAL
HCT VFR BLD AUTO: 44.2 % (ref 37–48.5)
HGB BLD-MCNC: 14.3 GM/DL (ref 12–16)
HGB UR QL STRIP: NEGATIVE
HOLD SPECIMEN: NORMAL
IMM GRANULOCYTES # BLD AUTO: 0.11 K/UL (ref 0–0.04)
IMM GRANULOCYTES NFR BLD AUTO: 0.9 % (ref 0–0.5)
KETONES UR QL STRIP: ABNORMAL
LEUKOCYTE ESTERASE UR QL STRIP: NEGATIVE
LIPASE SERPL-CCNC: 23 U/L (ref 4–60)
LYMPHOCYTES # BLD AUTO: 2.11 K/UL (ref 1–4.8)
MCH RBC QN AUTO: 29.2 PG (ref 27–31)
MCHC RBC AUTO-ENTMCNC: 32.4 G/DL (ref 32–36)
MCV RBC AUTO: 90 FL (ref 82–98)
METHADONE UR QL SCN: NEGATIVE
MICROSCOPIC COMMENT: NORMAL
NITRITE UR QL STRIP: NEGATIVE
NUCLEATED RBC (/100WBC) (OHS): 0 /100 WBC
OHS QRS DURATION: 76 MS
OHS QTC CALCULATION: 420 MS
OPIATES UR QL SCN: ABNORMAL
PCP UR QL: NEGATIVE
PH UR STRIP: 6 [PH]
PLATELET # BLD AUTO: 281 K/UL (ref 150–450)
PMV BLD AUTO: 11.2 FL (ref 9.2–12.9)
POTASSIUM SERPL-SCNC: 4 MMOL/L (ref 3.5–5.1)
PROT SERPL-MCNC: 7.2 GM/DL (ref 6–8.4)
PROT UR QL STRIP: NEGATIVE
RBC # BLD AUTO: 4.89 M/UL (ref 4–5.4)
RBC #/AREA URNS AUTO: 1 /HPF (ref 0–4)
RELATIVE EOSINOPHIL (OHS): 1 %
RELATIVE LYMPHOCYTE (OHS): 17.4 % (ref 18–48)
RELATIVE MONOCYTE (OHS): 6.5 % (ref 4–15)
RELATIVE NEUTROPHIL (OHS): 73.8 % (ref 38–73)
SODIUM SERPL-SCNC: 138 MMOL/L (ref 136–145)
SP GR UR STRIP: >=1.03
UROBILINOGEN UR STRIP-ACNC: NEGATIVE EU/DL
WBC # BLD AUTO: 12.11 K/UL (ref 3.9–12.7)
WBC #/AREA URNS AUTO: 1 /HPF (ref 0–5)
YEAST UR QL AUTO: NORMAL /HPF

## 2025-05-28 PROCEDURE — 85025 COMPLETE CBC W/AUTO DIFF WBC: CPT | Performed by: EMERGENCY MEDICINE

## 2025-05-28 PROCEDURE — 93005 ELECTROCARDIOGRAM TRACING: CPT

## 2025-05-28 PROCEDURE — 96375 TX/PRO/DX INJ NEW DRUG ADDON: CPT

## 2025-05-28 PROCEDURE — 25500020 PHARM REV CODE 255: Performed by: EMERGENCY MEDICINE

## 2025-05-28 PROCEDURE — 96361 HYDRATE IV INFUSION ADD-ON: CPT

## 2025-05-28 PROCEDURE — 81001 URINALYSIS AUTO W/SCOPE: CPT | Performed by: EMERGENCY MEDICINE

## 2025-05-28 PROCEDURE — 93010 ELECTROCARDIOGRAM REPORT: CPT | Mod: ,,, | Performed by: INTERNAL MEDICINE

## 2025-05-28 PROCEDURE — 63600175 PHARM REV CODE 636 W HCPCS: Performed by: EMERGENCY MEDICINE

## 2025-05-28 PROCEDURE — 80053 COMPREHEN METABOLIC PANEL: CPT | Performed by: EMERGENCY MEDICINE

## 2025-05-28 PROCEDURE — 80307 DRUG TEST PRSMV CHEM ANLYZR: CPT | Performed by: EMERGENCY MEDICINE

## 2025-05-28 PROCEDURE — 96374 THER/PROPH/DIAG INJ IV PUSH: CPT

## 2025-05-28 PROCEDURE — 99285 EMERGENCY DEPT VISIT HI MDM: CPT | Mod: 25

## 2025-05-28 PROCEDURE — 63600175 PHARM REV CODE 636 W HCPCS: Performed by: PHYSICIAN ASSISTANT

## 2025-05-28 PROCEDURE — 83690 ASSAY OF LIPASE: CPT | Performed by: EMERGENCY MEDICINE

## 2025-05-28 PROCEDURE — 81025 URINE PREGNANCY TEST: CPT | Performed by: EMERGENCY MEDICINE

## 2025-05-28 RX ORDER — MORPHINE SULFATE 4 MG/ML
4 INJECTION, SOLUTION INTRAMUSCULAR; INTRAVENOUS
Refills: 0 | Status: COMPLETED | OUTPATIENT
Start: 2025-05-28 | End: 2025-05-28

## 2025-05-28 RX ORDER — SODIUM CHLORIDE 0.9 % (FLUSH) 0.9 %
10 SYRINGE (ML) INJECTION
Status: DISCONTINUED | OUTPATIENT
Start: 2025-05-28 | End: 2025-05-28 | Stop reason: HOSPADM

## 2025-05-28 RX ORDER — DIPHENHYDRAMINE HYDROCHLORIDE 50 MG/ML
50 INJECTION, SOLUTION INTRAMUSCULAR; INTRAVENOUS
Status: DISCONTINUED | OUTPATIENT
Start: 2025-05-28 | End: 2025-05-28

## 2025-05-28 RX ORDER — DROPERIDOL 2.5 MG/ML
1.25 INJECTION, SOLUTION INTRAMUSCULAR; INTRAVENOUS
Status: COMPLETED | OUTPATIENT
Start: 2025-05-28 | End: 2025-05-28

## 2025-05-28 RX ORDER — LORAZEPAM 2 MG/ML
1 INJECTION INTRAMUSCULAR
Status: COMPLETED | OUTPATIENT
Start: 2025-05-28 | End: 2025-05-28

## 2025-05-28 RX ORDER — ONDANSETRON HYDROCHLORIDE 2 MG/ML
4 INJECTION, SOLUTION INTRAVENOUS
Status: COMPLETED | OUTPATIENT
Start: 2025-05-28 | End: 2025-05-28

## 2025-05-28 RX ADMIN — SODIUM CHLORIDE, POTASSIUM CHLORIDE, SODIUM LACTATE AND CALCIUM CHLORIDE 1000 ML: 600; 310; 30; 20 INJECTION, SOLUTION INTRAVENOUS at 02:05

## 2025-05-28 RX ADMIN — MORPHINE SULFATE 4 MG: 4 INJECTION INTRAVENOUS at 02:05

## 2025-05-28 RX ADMIN — LORAZEPAM 1 MG: 2 INJECTION INTRAMUSCULAR; INTRAVENOUS at 02:05

## 2025-05-28 RX ADMIN — DROPERIDOL 1.25 MG: 2.5 INJECTION, SOLUTION INTRAMUSCULAR; INTRAVENOUS at 02:05

## 2025-05-28 RX ADMIN — ONDANSETRON 4 MG: 2 INJECTION INTRAMUSCULAR; INTRAVENOUS at 02:05

## 2025-05-28 RX ADMIN — IOHEXOL 100 ML: 350 INJECTION, SOLUTION INTRAVENOUS at 05:05

## 2025-05-28 NOTE — TELEPHONE ENCOUNTER
----- Message from Mireya sent at 5/28/2025 11:12 AM CDT -----  Type:  Patient Returning CallWho Called:ptWho Left Message for Patient:ptDoes the patient know what this is regarding?:pt need a call I do have her getting over to on call nurse Symptom: Abdominal Pain - Female - Not PregnantOutcome: Transfer to a nurse or provider NOW!Reason: Fariha Henderson caller accepted this outcome.Would the patient rather a call back or a response via [facility]? [facility] Call Back Number:[phone] Information: call back

## 2025-05-28 NOTE — TELEPHONE ENCOUNTER
Notified by RN that Clinic patient in acute pain, unable to stand, known history of gallstones. Advised to go to ED emergently for evaluation. DO agrees with this Advice.

## 2025-05-28 NOTE — TELEPHONE ENCOUNTER
Central Scheduling tx    Denies currently pregnant  Pt reports that she is having severe abdominal pain, nausea, and diarrhea  Weakness    Too weak to stand      Dispo is call 911 now  Able to call 911; VU.      Reason for Disposition   Shock suspected (e.g., cold/pale/clammy skin, too weak to stand, low BP, rapid pulse)    Additional Information   Negative: Passed out (e.g., fainted, lost consciousness, blacked out and was not responding)    Protocols used: Abdominal Pain - Female-A-OH

## 2025-05-29 LAB
OHS QRS DURATION: 80 MS
OHS QRS DURATION: 84 MS
OHS QTC CALCULATION: 437 MS
OHS QTC CALCULATION: 456 MS

## 2025-05-30 ENCOUNTER — TELEPHONE (OUTPATIENT)
Dept: PAIN MEDICINE | Facility: CLINIC | Age: 25
End: 2025-05-30
Payer: OTHER GOVERNMENT

## 2025-05-30 NOTE — TELEPHONE ENCOUNTER
Tried calling patient on 5/30/25 @ 11:35 AM to reschedule f/u appointment with Dr. Dennison on 6/20/25 2@ 3:00pm. Patient do not have VM set up. Will call back     AC

## 2025-06-01 ENCOUNTER — PATIENT MESSAGE (OUTPATIENT)
Dept: GASTROENTEROLOGY | Facility: CLINIC | Age: 25
End: 2025-06-01
Payer: OTHER GOVERNMENT

## 2025-06-01 ENCOUNTER — RESULTS FOLLOW-UP (OUTPATIENT)
Dept: GASTROENTEROLOGY | Facility: CLINIC | Age: 25
End: 2025-06-01
Payer: OTHER GOVERNMENT

## 2025-06-01 DIAGNOSIS — R85.7 ABNORMAL SMALL BOWEL BIOPSY: Primary | ICD-10-CM

## 2025-06-01 DIAGNOSIS — E73.1 ACQUIRED LACTASE DEFICIENCY: ICD-10-CM

## 2025-06-02 ENCOUNTER — TELEPHONE (OUTPATIENT)
Dept: GASTROENTEROLOGY | Facility: CLINIC | Age: 25
End: 2025-06-02
Payer: OTHER GOVERNMENT

## 2025-06-09 ENCOUNTER — HOSPITAL ENCOUNTER (OUTPATIENT)
Dept: RADIOLOGY | Facility: HOSPITAL | Age: 25
Discharge: HOME | End: 2025-06-09
Attending: EMERGENCY MEDICINE
Payer: OTHER GOVERNMENT

## 2025-06-09 DIAGNOSIS — M54.9 DORSALGIA, UNSPECIFIED: ICD-10-CM

## 2025-06-09 PROCEDURE — 72148 MRI LUMBAR SPINE W/O DYE: CPT | Mod: 26,,, | Performed by: RADIOLOGY

## 2025-06-09 PROCEDURE — 72148 MRI LUMBAR SPINE W/O DYE: CPT | Mod: TC

## 2025-06-19 ENCOUNTER — NURSE TRIAGE (OUTPATIENT)
Dept: ADMINISTRATIVE | Facility: CLINIC | Age: 25
End: 2025-06-19
Payer: OTHER GOVERNMENT

## 2025-06-19 NOTE — TELEPHONE ENCOUNTER
Pt spouse called in and reports pt started taking Ozempic on Friday. Since then have been experiencing a bunch of different syptoms which is worse today. Vomiting, diarrhea, and extreme abdominal pain. Reports pt vomiting red blood. According to care advice, go to ED now. Pt's  verbalized understanding.  Reason for Disposition   Vomiting red blood or black (coffee ground) material    Additional Information   Negative: Shock suspected (e.g., cold/pale/clammy skin, too weak to stand, low BP, rapid pulse)   Negative: Difficult to awaken or acting confused (e.g., disoriented, slurred speech)   Negative: Sounds like a life-threatening emergency to the triager    Protocols used: Vomiting-A-OH

## 2025-07-05 ENCOUNTER — RESULTS FOLLOW-UP (OUTPATIENT)
Dept: GASTROENTEROLOGY | Facility: CLINIC | Age: 25
End: 2025-07-05

## 2025-07-05 ENCOUNTER — PATIENT MESSAGE (OUTPATIENT)
Dept: GASTROENTEROLOGY | Facility: CLINIC | Age: 25
End: 2025-07-05
Payer: OTHER GOVERNMENT

## 2025-07-22 ENCOUNTER — OFFICE VISIT (OUTPATIENT)
Dept: SURGERY | Facility: CLINIC | Age: 25
End: 2025-07-22
Payer: OTHER GOVERNMENT

## 2025-07-22 DIAGNOSIS — K80.20 SYMPTOMATIC CHOLELITHIASIS: Primary | ICD-10-CM

## 2025-07-22 NOTE — PROGRESS NOTES
The patient location is: [place]  The chief complaint leading to consultation is: gallstones    Visit type: audiovisual      24 minutes of total time spent on the encounter, which includes face to face time and non-face to face time preparing to see the patient (eg, review of tests), Obtaining and/or reviewing separately obtained history, Documenting clinical information in the electronic or other health record, Independently interpreting results (not separately reported) and communicating results to the patient/family/caregiver, or Care coordination (not separately reported).         Each patient to whom he or she provides medical services by telemedicine is:  (1) informed of the relationship between the physician and patient and the respective role of any other health care provider with respect to management of the patient; and (2) notified that he or she may decline to receive medical services by telemedicine and may withdraw from such care at any time.    Notes:     25 y.o. female presents with morbid obesity (last bmi in record is 54), pcos, dm2, hydradenitis, current every day smoker, lactose intolerance and symptomatic cholelithiasis.      History 5/13/25: She is having a bunch of issues.  She has knee injuries and ankle issues.  This has worsened and now she has pelvic and back pain.  She says an epidural she got during pregnancy gave her trouble on her left side.  She was told she had a hernia and pcos.  She has chronic pain so bad she can only stand up sometimes for a few minutes, can't stretch and can barely do the dishes.  She takes ibuprofen and baths without much relief.  She generally has 8-10/10 pain.  She frequently goes to the hospital for pain control.  She can't run and can only walk short distances.  She has a hard time bending.  Her main complaint is these pains.     She is also having some abdominal pain.  She says the stones have grown.  She says she has weird stomach pains.  Most of the  time she has a stabbing/punching pain and other times a needle/dull pain and other times burning.  She says the pain is always there and is all over her stomach, especially the mid abdomin and bilaterally lower abdomen.  She says activity or lifting increases the pain.  Any activity more than 10 minutes makes her lay down for the rest of the day due to pain.  She says pain medications help such as opioids, tramadol.  Nothing else has helped.  Tylenol and nsaids have not helped but they do help with headaches.  She has gone to the hospital for this pain.  She has some nausea, vomiting and diarrhea with this.    Interval history 7/22/25: She has been diagnosed with lactose intolerance.  She took ozempic with increased GI symptoms.  She has been to the ER at the end of May for increased GI symptoms and had out of control diabetes by GLC elevation and likely opioids in her urine.  Her CT showed no acute abnormalities.  Now with gallstones but atypical symptoms.  She saw pain management for back pain (but not stomach pain).  She got a spine xray and has scoliosis.  She stopped ibuprofen but is taking more tylenol (mostly for headaches associated with light sensitivity).  She took ozempic but this worsened her pain.  She is taking ppi daily and this helps with reflux.  She has been referred to bariatrics for weight loss.     shows last narcotics rx filled was last year.    Laboratory  CBC: Reviewed  CMP: Reviewed  Elevated glc c/w dm, elevated alt of unknown etiology     Diagnostic Results:  U/s reviewed, films viewed, large gallstones (up to 2cm), no findings of acute cindi, fatty liver  Hida with EF reviewed, normal  Ct 2024 reviewed, films viewed, gallstone not seen, no acute findings, fatty liver  Ct 2025 reviewed, films viewed, stomach and gallbladder appear ok, no acute findings, other findings in report  Egd reviewed, 1cm hh, gastritis    ASSESSMENT/PLAN:      Chronic pain.  Abdominal pain but not typical for  gallstone pain although she has large gallstone which is an indication for removal.  Possible IBS, dyspepsia or other GI etiologies of pain.     PLAN:     I suggest (robotic) cindi.  Outpatient, pain block.  After pain control prior to cindi she will need a 2 week bariatric liquid diet and pcp clearance.  She is being followed by pain management, GI and bariatrics. Obtain ges.  She is going to look into a thermal scan to see if that shows anything about her pain.

## 2025-07-28 ENCOUNTER — TELEPHONE (OUTPATIENT)
Dept: BARIATRICS | Facility: CLINIC | Age: 25
End: 2025-07-28
Payer: OTHER GOVERNMENT

## 2025-07-28 NOTE — TELEPHONE ENCOUNTER
Routed note to Dr. Mclain asking how he would like us to schedule consult appointments for bariatric surgery. See general surgery note with Dr. Mclain on 5/13/2025 and 7/22/2025.

## 2025-07-29 NOTE — TELEPHONE ENCOUNTER
Dr. Mclain replied he was ok with pt scheduling consult appointments for workup for bariatric surgery and she would need psychological clearance first due to dx. Of bipolar affective disorder.

## 2025-07-29 NOTE — TELEPHONE ENCOUNTER
Attempted to reach pt, no answer and no vm, will send portal message, pt currently in the bariatric work queue, completed phone call with financial services on 7/22/2025.  Pt referred by Burke Forrest DO for evaluation for weight loss surgery. Per chart patient has a BMI of 53.7 with co-morbidities of Bipolar affective disorder, DM, Hidradenitis, and symptomatic cholelithiasis.      Ins:     Guarantor note noted:  Coverage verified and is active in RTE: Yes  Is this a Dual/SNP Plan: No  Is [facility] In-network or out-of-network for Bariatric surgery?  In Network   Is non-surgical morbid obesity medical weight loss covered?  No  Does Patient have coverage for Bariatric Surgery?  Yes   Would Bariatric surgery be covered for inpatient, outpatient, or both?  Both  Can patient surgery be performed at [facility]? Yes      Does Bariatric surgery get billed through a third-party provider? If yes, Is this third-party provider in-network or out-of-network with [facility]? No  Does this facility need any accreditation requirements? No    Medical Criteria in media noted:       Assigned Nurse Coordinator: Abi Gomez RN    Dashboard updated with patient status and guarantor information. Snapshot updated.

## 2025-07-30 ENCOUNTER — TELEPHONE (OUTPATIENT)
Dept: SURGERY | Facility: CLINIC | Age: 25
End: 2025-07-30
Payer: OTHER GOVERNMENT

## 2025-08-08 ENCOUNTER — OFFICE VISIT (OUTPATIENT)
Facility: CLINIC | Age: 25
End: 2025-08-08
Payer: OTHER GOVERNMENT

## 2025-08-08 VITALS
SYSTOLIC BLOOD PRESSURE: 127 MMHG | WEIGHT: 274.69 LBS | HEIGHT: 61 IN | HEART RATE: 102 BPM | DIASTOLIC BLOOD PRESSURE: 84 MMHG | BODY MASS INDEX: 51.86 KG/M2

## 2025-08-08 DIAGNOSIS — G43.009 MIGRAINE WITHOUT AURA AND WITHOUT STATUS MIGRAINOSUS, NOT INTRACTABLE: Primary | ICD-10-CM

## 2025-08-08 DIAGNOSIS — G47.30 SLEEP APNEA, UNSPECIFIED TYPE: ICD-10-CM

## 2025-08-08 DIAGNOSIS — F32.A FATIGUE DUE TO DEPRESSION: ICD-10-CM

## 2025-08-08 DIAGNOSIS — R53.83 FATIGUE DUE TO DEPRESSION: ICD-10-CM

## 2025-08-08 PROCEDURE — 99999 PR PBB SHADOW E&M-EST. PATIENT-LVL III: CPT | Mod: PBBFAC,,, | Performed by: NURSE PRACTITIONER

## 2025-08-08 PROCEDURE — 99213 OFFICE O/P EST LOW 20 MIN: CPT | Mod: PBBFAC,PO | Performed by: NURSE PRACTITIONER

## 2025-08-08 RX ORDER — SUMATRIPTAN SUCCINATE 100 MG/1
50-100 TABLET ORAL
Qty: 9 TABLET | Refills: 3 | Status: SHIPPED | OUTPATIENT
Start: 2025-08-08 | End: 2025-09-07

## 2025-08-08 RX ORDER — TOPIRAMATE 25 MG/1
25 TABLET, FILM COATED ORAL SEE ADMIN INSTRUCTIONS
Qty: 90 TABLET | Refills: 1 | Status: SHIPPED | OUTPATIENT
Start: 2025-08-08 | End: 2025-09-07

## 2025-08-08 NOTE — PROGRESS NOTES
"Referred by Dr. Forrest    CHIEF COMPLAINT: Sleep evaluation    HISTORY OF PRESENT ILLNESS: She has never had a sleep study. +snores but non-disruptive.  goes to be earlier because he has to be up 0430. She has trouble falling and staying asleep. Often awakened when spouse wakes. Denies witnessed apneic pauses. +daytime sleepiness but not likely to doze,w [place] 5yo son. AM headaches and all day have headache unrelieved with tylenol. Her sleep is non-existent. Nasal congestion worse at bedtime. Wakes with jaw pain. +disrupted sleep 2-3x.   +frontal or R posterior pain with nausea, photo/phonophobia.     On todays [place] Sleepiness Scale the patient scores a 2/24.       FAMILY HISTORY:dad JOCELYN  SOCIAL HISTORY:   /84 (BP Location: Right arm, Patient Position: Sitting)   Pulse 102   Ht 5' 1" (1.549 m)   Wt 124.6 kg (274 lb 11.2 oz)   BMI 51.90 kg/m²   Tonsils 3-4+, 17" neck       ASSESSMENT:   Unspecified Sleep Apnea, with symptoms of snoring,un-refreshing disrupted sleep and excessive daytime sleepiness,  with medical comorbidities of obesity, migraine. Warrants further investigation for untreated sleep apnea.   Tonsillar hypertrophy    PLAN:   1 given current sleep schedule, obtain Home Sleep Study, discussed plan of care    2. Discussed etiology of JOCELYN and potential ramifications of untreated JOCELYN, including mood disturbance, impaired metabolism,  cardiovascular stress.    Topamax 25mg po with uptitration weekly to goal 75mg qhs (sleep/wgt loss/headaches). PRN Imitrex 100mg 1/2-1 tab PO q2h prn + Ibuprofen    Thank you for allowing me the opportunity to participate in the care of your patient        "

## 2025-08-15 ENCOUNTER — HOSPITAL ENCOUNTER (OUTPATIENT)
Dept: CARDIOLOGY | Facility: CLINIC | Age: 25
Discharge: HOME | End: 2025-08-15
Payer: OTHER GOVERNMENT

## 2025-08-15 ENCOUNTER — OFFICE VISIT (OUTPATIENT)
Dept: BARIATRICS | Facility: CLINIC | Age: 25
End: 2025-08-15
Payer: OTHER GOVERNMENT

## 2025-08-15 ENCOUNTER — PATIENT MESSAGE (OUTPATIENT)
Dept: BARIATRICS | Facility: CLINIC | Age: 25
End: 2025-08-15

## 2025-08-15 ENCOUNTER — CLINICAL SUPPORT (OUTPATIENT)
Dept: BARIATRICS | Facility: CLINIC | Age: 25
End: 2025-08-15
Payer: OTHER GOVERNMENT

## 2025-08-15 ENCOUNTER — HOSPITAL ENCOUNTER (OUTPATIENT)
Dept: RADIOLOGY | Facility: HOSPITAL | Age: 25
Discharge: HOME | End: 2025-08-15
Attending: PHYSICIAN ASSISTANT
Payer: OTHER GOVERNMENT

## 2025-08-15 VITALS — WEIGHT: 272.06 LBS | BODY MASS INDEX: 51.36 KG/M2 | HEIGHT: 61 IN

## 2025-08-15 DIAGNOSIS — F31.9 BIPOLAR AFFECTIVE DISORDER, REMISSION STATUS UNSPECIFIED: ICD-10-CM

## 2025-08-15 DIAGNOSIS — G47.33 OSA (OBSTRUCTIVE SLEEP APNEA): ICD-10-CM

## 2025-08-15 DIAGNOSIS — Z72.0 TOBACCO ABUSE: ICD-10-CM

## 2025-08-15 DIAGNOSIS — E11.9 TYPE 2 DIABETES MELLITUS WITHOUT COMPLICATION, WITHOUT LONG-TERM CURRENT USE OF INSULIN: ICD-10-CM

## 2025-08-15 DIAGNOSIS — E66.01 MORBID OBESITY WITH BMI OF 50.0-59.9, ADULT: ICD-10-CM

## 2025-08-15 DIAGNOSIS — Z71.3 DIETARY COUNSELING AND SURVEILLANCE: Primary | ICD-10-CM

## 2025-08-15 LAB
OHS QRS DURATION: 84 MS
OHS QTC CALCULATION: 449 MS

## 2025-08-15 PROCEDURE — 99999 PR PBB SHADOW E&M-EST. PATIENT-LVL III: CPT | Mod: PBBFAC,,, | Performed by: PHYSICIAN ASSISTANT

## 2025-08-15 PROCEDURE — 93010 ELECTROCARDIOGRAM REPORT: CPT | Mod: S$PBB,,, | Performed by: INTERNAL MEDICINE

## 2025-08-15 PROCEDURE — 99213 OFFICE O/P EST LOW 20 MIN: CPT | Mod: PBBFAC,25 | Performed by: PHYSICIAN ASSISTANT

## 2025-08-15 PROCEDURE — 71046 X-RAY EXAM CHEST 2 VIEWS: CPT | Mod: TC

## 2025-08-15 PROCEDURE — 93005 ELECTROCARDIOGRAM TRACING: CPT | Mod: PBBFAC | Performed by: INTERNAL MEDICINE

## 2025-08-15 PROCEDURE — 99211 OFF/OP EST MAY X REQ PHY/QHP: CPT | Mod: PBBFAC,27 | Performed by: DIETITIAN, REGISTERED

## 2025-08-15 PROCEDURE — 71046 X-RAY EXAM CHEST 2 VIEWS: CPT | Mod: 26,,, | Performed by: RADIOLOGY

## 2025-08-15 PROCEDURE — 99999 PR PBB SHADOW E&M-EST. PATIENT-LVL I: CPT | Mod: PBBFAC,,, | Performed by: DIETITIAN, REGISTERED

## 2025-08-25 ENCOUNTER — OFFICE VISIT (OUTPATIENT)
Dept: INTERNAL MEDICINE | Facility: CLINIC | Age: 25
End: 2025-08-25
Payer: OTHER GOVERNMENT

## 2025-08-25 VITALS
SYSTOLIC BLOOD PRESSURE: 126 MMHG | BODY MASS INDEX: 51.2 KG/M2 | WEIGHT: 271.19 LBS | HEIGHT: 61 IN | HEART RATE: 98 BPM | OXYGEN SATURATION: 100 % | DIASTOLIC BLOOD PRESSURE: 84 MMHG

## 2025-08-25 DIAGNOSIS — G43.009 MIGRAINE WITHOUT AURA AND WITHOUT STATUS MIGRAINOSUS, NOT INTRACTABLE: ICD-10-CM

## 2025-08-25 DIAGNOSIS — R10.10 PAIN OF UPPER ABDOMEN: ICD-10-CM

## 2025-08-25 DIAGNOSIS — E11.9 TYPE 2 DIABETES MELLITUS WITHOUT COMPLICATION, WITHOUT LONG-TERM CURRENT USE OF INSULIN: ICD-10-CM

## 2025-08-25 DIAGNOSIS — G44.89 HEADACHE SYNDROME: ICD-10-CM

## 2025-08-25 DIAGNOSIS — F17.200 SMOKER: ICD-10-CM

## 2025-08-25 DIAGNOSIS — E55.9 VITAMIN D DEFICIENCY: ICD-10-CM

## 2025-08-25 PROBLEM — Z13.1 SCREENING FOR DIABETES MELLITUS: Status: ACTIVE | Noted: 2025-08-25

## 2025-08-25 PROCEDURE — 99215 OFFICE O/P EST HI 40 MIN: CPT | Mod: PBBFAC

## 2025-08-25 PROCEDURE — 99999 PR PBB SHADOW E&M-EST. PATIENT-LVL V: CPT | Mod: PBBFAC,,,

## 2025-08-25 RX ORDER — LANCETS
EACH MISCELLANEOUS
Qty: 100 EACH | Refills: 3 | Status: SHIPPED | OUTPATIENT
Start: 2025-08-25 | End: 2025-08-25

## 2025-08-25 RX ORDER — ERGOCALCIFEROL 1.25 MG/1
50000 CAPSULE ORAL
Qty: 12 CAPSULE | Refills: 0 | Status: SHIPPED | OUTPATIENT
Start: 2025-08-25 | End: 2025-11-11

## 2025-08-25 RX ORDER — TIRZEPATIDE 2.5 MG/.5ML
2.5 INJECTION, SOLUTION SUBCUTANEOUS
Qty: 2 ML | Refills: 11 | Status: CANCELLED | OUTPATIENT
Start: 2025-08-25 | End: 2026-08-25

## 2025-08-25 RX ORDER — DULAGLUTIDE 0.75 MG/.5ML
0.75 INJECTION, SOLUTION SUBCUTANEOUS
Qty: 4 PEN | Refills: 11 | Status: CANCELLED | OUTPATIENT
Start: 2025-08-25 | End: 2026-08-25

## 2025-08-25 RX ORDER — INSULIN GLARGINE 100 [IU]/ML
25 INJECTION, SOLUTION SUBCUTANEOUS NIGHTLY
Qty: 7.5 ML | Refills: 11 | Status: CANCELLED | OUTPATIENT
Start: 2025-08-25 | End: 2026-08-25

## 2025-08-25 RX ORDER — TIRZEPATIDE 2.5 MG/.5ML
2.5 INJECTION, SOLUTION SUBCUTANEOUS
Qty: 2 ML | Refills: 11 | Status: SHIPPED | OUTPATIENT
Start: 2025-08-25 | End: 2026-08-25

## 2025-08-25 RX ORDER — LANCETS
EACH MISCELLANEOUS
Qty: 100 EACH | Refills: 3 | Status: SHIPPED | OUTPATIENT
Start: 2025-08-25

## 2025-08-25 RX ORDER — TIRZEPATIDE 2.5 MG/.5ML
2.5 INJECTION, SOLUTION SUBCUTANEOUS
Qty: 2 ML | Refills: 11 | Status: SHIPPED | OUTPATIENT
Start: 2025-08-25 | End: 2025-08-25

## 2025-08-25 RX ORDER — INSULIN PUMP SYRINGE, 3 ML
EACH MISCELLANEOUS
Qty: 1 EACH | Refills: 0 | Status: SHIPPED | OUTPATIENT
Start: 2025-08-25

## 2025-08-25 RX ORDER — INSULIN PUMP SYRINGE, 3 ML
EACH MISCELLANEOUS
Qty: 1 EACH | Refills: 0 | Status: SHIPPED | OUTPATIENT
Start: 2025-08-25 | End: 2025-08-25

## 2025-08-26 RX ORDER — SUMATRIPTAN SUCCINATE 100 MG/1
50-100 TABLET ORAL
Qty: 9 TABLET | Refills: 3 | Status: SHIPPED | OUTPATIENT
Start: 2025-08-26 | End: 2025-09-25

## 2025-08-26 RX ORDER — TOPIRAMATE 25 MG/1
25 TABLET, FILM COATED ORAL SEE ADMIN INSTRUCTIONS
Qty: 90 TABLET | Refills: 1 | Status: SHIPPED | OUTPATIENT
Start: 2025-08-26 | End: 2025-09-25

## 2025-08-27 DIAGNOSIS — K21.9 GASTROESOPHAGEAL REFLUX DISEASE, UNSPECIFIED WHETHER ESOPHAGITIS PRESENT: Primary | ICD-10-CM

## 2025-09-01 PROBLEM — E11.9 TYPE 2 DIABETES MELLITUS WITHOUT COMPLICATION, WITHOUT LONG-TERM CURRENT USE OF INSULIN: Status: RESOLVED | Noted: 2025-08-25 | Resolved: 2025-09-01

## 2025-09-01 PROBLEM — Z13.1 SCREENING FOR DIABETES MELLITUS: Status: RESOLVED | Noted: 2025-08-25 | Resolved: 2025-09-01

## 2025-09-02 PROBLEM — G47.30 SLEEP APNEA: Status: ACTIVE | Noted: 2025-09-02

## 2025-09-03 PROBLEM — G47.33 OSA (OBSTRUCTIVE SLEEP APNEA): Status: ACTIVE | Noted: 2025-09-02

## 2025-09-04 DIAGNOSIS — G43.009 MIGRAINE WITHOUT AURA AND WITHOUT STATUS MIGRAINOSUS, NOT INTRACTABLE: ICD-10-CM

## 2025-09-04 RX ORDER — TOPIRAMATE 25 MG/1
25 TABLET, FILM COATED ORAL SEE ADMIN INSTRUCTIONS
Qty: 90 TABLET | Refills: 1 | Status: SHIPPED | OUTPATIENT
Start: 2025-09-04 | End: 2025-10-04

## 2025-09-05 ENCOUNTER — TELEPHONE (OUTPATIENT)
Dept: SURGERY | Facility: CLINIC | Age: 25
End: 2025-09-05
Payer: OTHER GOVERNMENT